# Patient Record
Sex: FEMALE | Race: WHITE | NOT HISPANIC OR LATINO | Employment: UNEMPLOYED | URBAN - NONMETROPOLITAN AREA
[De-identification: names, ages, dates, MRNs, and addresses within clinical notes are randomized per-mention and may not be internally consistent; named-entity substitution may affect disease eponyms.]

---

## 2017-01-19 ENCOUNTER — LAB REQUISITION (OUTPATIENT)
Dept: LAB | Facility: HOSPITAL | Age: 72
End: 2017-01-19

## 2017-01-19 DIAGNOSIS — D64.9 ANEMIA: ICD-10-CM

## 2017-01-19 DIAGNOSIS — J44.9 CHRONIC OBSTRUCTIVE PULMONARY DISEASE (HCC): ICD-10-CM

## 2017-01-19 DIAGNOSIS — I10 ESSENTIAL (PRIMARY) HYPERTENSION: ICD-10-CM

## 2017-01-19 LAB
ALBUMIN SERPL-MCNC: 3.8 G/DL (ref 3.4–4.8)
ALBUMIN/GLOB SERPL: 1.8 G/DL (ref 1.5–2.5)
ALP SERPL-CCNC: 54 U/L (ref 46–116)
ALT SERPL W P-5'-P-CCNC: 8 U/L (ref 10–36)
ANION GAP SERPL CALCULATED.3IONS-SCNC: 3.3 MMOL/L (ref 3.6–11.2)
AST SERPL-CCNC: 17 U/L (ref 10–30)
BASOPHILS # BLD AUTO: 0.05 10*3/MM3 (ref 0–0.3)
BASOPHILS NFR BLD AUTO: 0.4 % (ref 0–2)
BILIRUB SERPL-MCNC: 0.2 MG/DL (ref 0.2–1.8)
BUN BLD-MCNC: 14 MG/DL (ref 7–21)
BUN/CREAT SERPL: 26.9 (ref 7–25)
CALCIUM SPEC-SCNC: 8.8 MG/DL (ref 7.7–10)
CHLORIDE SERPL-SCNC: 104 MMOL/L (ref 99–112)
CO2 SERPL-SCNC: 38.7 MMOL/L (ref 24.3–31.9)
CREAT BLD-MCNC: 0.52 MG/DL (ref 0.43–1.29)
DEPRECATED RDW RBC AUTO: 52.6 FL (ref 37–54)
EOSINOPHIL # BLD AUTO: 0.42 10*3/MM3 (ref 0–0.7)
EOSINOPHIL NFR BLD AUTO: 3.3 % (ref 0–7)
ERYTHROCYTE [DISTWIDTH] IN BLOOD BY AUTOMATED COUNT: 14.7 % (ref 11.5–14.5)
GFR SERPL CREATININE-BSD FRML MDRD: 116 ML/MIN/1.73
GLOBULIN UR ELPH-MCNC: 2.1 GM/DL
GLUCOSE BLD-MCNC: 90 MG/DL (ref 70–110)
HCT VFR BLD AUTO: 34.7 % (ref 37–47)
HGB BLD-MCNC: 11 G/DL (ref 12–16)
IMM GRANULOCYTES # BLD: 0.03 10*3/MM3 (ref 0–0.03)
IMM GRANULOCYTES NFR BLD: 0.2 % (ref 0–0.5)
LYMPHOCYTES # BLD AUTO: 2.43 10*3/MM3 (ref 1–3)
LYMPHOCYTES NFR BLD AUTO: 18.8 % (ref 16–46)
MCH RBC QN AUTO: 31.1 PG (ref 27–33)
MCHC RBC AUTO-ENTMCNC: 31.7 G/DL (ref 33–37)
MCV RBC AUTO: 98 FL (ref 80–94)
MONOCYTES # BLD AUTO: 1.21 10*3/MM3 (ref 0.1–0.9)
MONOCYTES NFR BLD AUTO: 9.4 % (ref 0–12)
NEUTROPHILS # BLD AUTO: 8.77 10*3/MM3 (ref 1.4–6.5)
NEUTROPHILS NFR BLD AUTO: 67.9 % (ref 40–75)
OSMOLALITY SERPL CALC.SUM OF ELEC: 290.6 MOSM/KG (ref 273–305)
PLATELET # BLD AUTO: 341 10*3/MM3 (ref 130–400)
PMV BLD AUTO: 10.1 FL (ref 6–10)
POTASSIUM BLD-SCNC: 4.7 MMOL/L (ref 3.5–5.3)
PROT SERPL-MCNC: 5.9 G/DL (ref 6–8)
RBC # BLD AUTO: 3.54 10*6/MM3 (ref 4.2–5.4)
SODIUM BLD-SCNC: 146 MMOL/L (ref 135–153)
WBC NRBC COR # BLD: 12.91 10*3/MM3 (ref 4.5–12.5)

## 2017-01-19 PROCEDURE — 85025 COMPLETE CBC W/AUTO DIFF WBC: CPT | Performed by: INTERNAL MEDICINE

## 2017-01-19 PROCEDURE — 80053 COMPREHEN METABOLIC PANEL: CPT | Performed by: INTERNAL MEDICINE

## 2017-02-17 ENCOUNTER — APPOINTMENT (OUTPATIENT)
Dept: GENERAL RADIOLOGY | Facility: HOSPITAL | Age: 72
End: 2017-02-17

## 2017-02-17 ENCOUNTER — HOSPITAL ENCOUNTER (INPATIENT)
Facility: HOSPITAL | Age: 72
LOS: 5 days | Discharge: NURSING, REHAB OR LONG-TERM CARE | End: 2017-02-22
Attending: EMERGENCY MEDICINE | Admitting: INTERNAL MEDICINE

## 2017-02-17 DIAGNOSIS — R09.02 HYPOXIA: ICD-10-CM

## 2017-02-17 DIAGNOSIS — J18.9 PNEUMONIA OF LEFT LOWER LOBE DUE TO INFECTIOUS ORGANISM: Primary | ICD-10-CM

## 2017-02-17 DIAGNOSIS — A41.9 SEPSIS, DUE TO UNSPECIFIED ORGANISM: ICD-10-CM

## 2017-02-17 LAB
A-A DO2: 121.5 MMHG (ref 0–300)
ALBUMIN SERPL-MCNC: 4.9 G/DL (ref 3.4–4.8)
ALBUMIN/GLOB SERPL: 1.7 G/DL (ref 1.5–2.5)
ALP SERPL-CCNC: 51 U/L (ref 35–104)
ALT SERPL W P-5'-P-CCNC: 16 U/L (ref 10–36)
ANION GAP SERPL CALCULATED.3IONS-SCNC: 6.3 MMOL/L (ref 3.6–11.2)
ARTERIAL PATENCY WRIST A: ABNORMAL
AST SERPL-CCNC: 28 U/L (ref 10–30)
ATMOSPHERIC PRESS: 725 MMHG
BASE EXCESS BLDA CALC-SCNC: 7 MMOL/L
BASOPHILS # BLD AUTO: 0.04 10*3/MM3 (ref 0–0.3)
BASOPHILS NFR BLD AUTO: 0.3 % (ref 0–2)
BDY SITE: ABNORMAL
BILIRUB SERPL-MCNC: 0.4 MG/DL (ref 0.2–1.8)
BODY TEMPERATURE: 98.6 C
BUN BLD-MCNC: 14 MG/DL (ref 7–21)
BUN/CREAT SERPL: 30.4 (ref 7–25)
CALCIUM SPEC-SCNC: 10.1 MG/DL (ref 7.7–10)
CHLORIDE SERPL-SCNC: 103 MMOL/L (ref 99–112)
CO2 SERPL-SCNC: 34.7 MMOL/L (ref 24.3–31.9)
COHGB MFR BLD: 1.3 % (ref 0–5)
CREAT BLD-MCNC: 0.46 MG/DL (ref 0.43–1.29)
D-LACTATE SERPL-SCNC: 1.3 MMOL/L (ref 0.5–2)
D-LACTATE SERPL-SCNC: 3.7 MMOL/L (ref 0.5–2)
DEPRECATED RDW RBC AUTO: 51.1 FL (ref 37–54)
EOSINOPHIL # BLD AUTO: 0.18 10*3/MM3 (ref 0–0.7)
EOSINOPHIL NFR BLD AUTO: 1.2 % (ref 0–7)
ERYTHROCYTE [DISTWIDTH] IN BLOOD BY AUTOMATED COUNT: 13.8 % (ref 11.5–14.5)
GAS FLOW AIRWAY: 3 LPM
GFR SERPL CREATININE-BSD FRML MDRD: 134 ML/MIN/1.73
GLOBULIN UR ELPH-MCNC: 2.9 GM/DL
GLUCOSE BLD-MCNC: 93 MG/DL (ref 70–110)
HCO3 BLDA-SCNC: 32 MMOL/L (ref 22–26)
HCT VFR BLD AUTO: 43.9 % (ref 37–47)
HCT VFR BLD CALC: 39 % (ref 37–47)
HGB BLD-MCNC: 13.4 G/DL (ref 12–16)
HGB BLDA-MCNC: 13.2 G/DL (ref 12–16)
HOLD SPECIMEN: NORMAL
HOROWITZ INDEX BLD+IHG-RTO: 32 %
IMM GRANULOCYTES # BLD: 0.08 10*3/MM3 (ref 0–0.03)
IMM GRANULOCYTES NFR BLD: 0.5 % (ref 0–0.5)
LYMPHOCYTES # BLD AUTO: 2.52 10*3/MM3 (ref 1–3)
LYMPHOCYTES NFR BLD AUTO: 16.7 % (ref 16–46)
MCH RBC QN AUTO: 30.7 PG (ref 27–33)
MCHC RBC AUTO-ENTMCNC: 30.5 G/DL (ref 33–37)
MCV RBC AUTO: 100.7 FL (ref 80–94)
METHGB BLD QL: 0.3 % (ref 0–3)
MODALITY: ABNORMAL
MONOCYTES # BLD AUTO: 1.36 10*3/MM3 (ref 0.1–0.9)
MONOCYTES NFR BLD AUTO: 9 % (ref 0–12)
NEUTROPHILS # BLD AUTO: 10.93 10*3/MM3 (ref 1.4–6.5)
NEUTROPHILS NFR BLD AUTO: 72.3 % (ref 40–75)
OSMOLALITY SERPL CALC.SUM OF ELEC: 287 MOSM/KG (ref 273–305)
OXYHGB MFR BLDV: 78.7 % (ref 85–100)
PCO2 BLDA: 46.5 MM HG (ref 35–45)
PH BLDA: 7.46 PH UNITS (ref 7.35–7.45)
PLATELET # BLD AUTO: 270 10*3/MM3 (ref 130–400)
PMV BLD AUTO: 10.6 FL (ref 6–10)
PO2 BLDA: 41.1 MM HG (ref 80–100)
POTASSIUM BLD-SCNC: 4.3 MMOL/L (ref 3.5–5.3)
PROT SERPL-MCNC: 7.8 G/DL (ref 6–8)
RBC # BLD AUTO: 4.36 10*6/MM3 (ref 4.2–5.4)
SAO2 % BLDCOA: 80 % (ref 90–100)
SODIUM BLD-SCNC: 144 MMOL/L (ref 135–153)
WBC NRBC COR # BLD: 15.11 10*3/MM3 (ref 4.5–12.5)
WHOLE BLOOD HOLD SPECIMEN: NORMAL
WHOLE BLOOD HOLD SPECIMEN: NORMAL

## 2017-02-17 PROCEDURE — 36600 WITHDRAWAL OF ARTERIAL BLOOD: CPT | Performed by: EMERGENCY MEDICINE

## 2017-02-17 PROCEDURE — 94799 UNLISTED PULMONARY SVC/PX: CPT

## 2017-02-17 PROCEDURE — 25010000002 METHYLPREDNISOLONE PER 40 MG: Performed by: INTERNAL MEDICINE

## 2017-02-17 PROCEDURE — 25010000002 METHYLPREDNISOLONE PER 125 MG: Performed by: EMERGENCY MEDICINE

## 2017-02-17 PROCEDURE — 25010000002 CEFTRIAXONE: Performed by: EMERGENCY MEDICINE

## 2017-02-17 PROCEDURE — 25010000002 ENOXAPARIN PER 10 MG: Performed by: INTERNAL MEDICINE

## 2017-02-17 PROCEDURE — 94640 AIRWAY INHALATION TREATMENT: CPT

## 2017-02-17 PROCEDURE — 82375 ASSAY CARBOXYHB QUANT: CPT | Performed by: EMERGENCY MEDICINE

## 2017-02-17 PROCEDURE — 71010 HC CHEST PA OR AP: CPT

## 2017-02-17 PROCEDURE — 83605 ASSAY OF LACTIC ACID: CPT | Performed by: EMERGENCY MEDICINE

## 2017-02-17 PROCEDURE — 93005 ELECTROCARDIOGRAM TRACING: CPT | Performed by: EMERGENCY MEDICINE

## 2017-02-17 PROCEDURE — 25010000002 AZITHROMYCIN: Performed by: EMERGENCY MEDICINE

## 2017-02-17 PROCEDURE — 25010000002 PROCHLORPERAZINE EDISYLATE PER 10 MG: Performed by: EMERGENCY MEDICINE

## 2017-02-17 PROCEDURE — 99284 EMERGENCY DEPT VISIT MOD MDM: CPT

## 2017-02-17 PROCEDURE — 93010 ELECTROCARDIOGRAM REPORT: CPT | Performed by: INTERNAL MEDICINE

## 2017-02-17 PROCEDURE — 83050 HGB METHEMOGLOBIN QUAN: CPT | Performed by: EMERGENCY MEDICINE

## 2017-02-17 PROCEDURE — 87040 BLOOD CULTURE FOR BACTERIA: CPT | Performed by: EMERGENCY MEDICINE

## 2017-02-17 PROCEDURE — 82805 BLOOD GASES W/O2 SATURATION: CPT | Performed by: EMERGENCY MEDICINE

## 2017-02-17 PROCEDURE — 80053 COMPREHEN METABOLIC PANEL: CPT | Performed by: EMERGENCY MEDICINE

## 2017-02-17 PROCEDURE — 85025 COMPLETE CBC W/AUTO DIFF WBC: CPT | Performed by: EMERGENCY MEDICINE

## 2017-02-17 PROCEDURE — 71010 XR CHEST 1 VW: CPT | Performed by: RADIOLOGY

## 2017-02-17 RX ORDER — LITHIUM CARBONATE 150 MG/1
150 CAPSULE ORAL 2 TIMES DAILY WITH MEALS
Status: DISCONTINUED | OUTPATIENT
Start: 2017-02-17 | End: 2017-02-22 | Stop reason: HOSPADM

## 2017-02-17 RX ORDER — ALBUTEROL SULFATE 2.5 MG/3ML
2.5 SOLUTION RESPIRATORY (INHALATION) EVERY 4 HOURS PRN
Status: CANCELLED | OUTPATIENT
Start: 2017-02-17

## 2017-02-17 RX ORDER — PANTOPRAZOLE SODIUM 40 MG/1
40 TABLET, DELAYED RELEASE ORAL
Status: DISCONTINUED | OUTPATIENT
Start: 2017-02-17 | End: 2017-02-18 | Stop reason: SDUPTHER

## 2017-02-17 RX ORDER — SENNA PLUS 8.6 MG/1
1 TABLET ORAL DAILY
Status: DISCONTINUED | OUTPATIENT
Start: 2017-02-18 | End: 2017-02-22 | Stop reason: HOSPADM

## 2017-02-17 RX ORDER — NITROGLYCERIN 0.4 MG/1
0.4 TABLET SUBLINGUAL
Status: DISCONTINUED | OUTPATIENT
Start: 2017-02-17 | End: 2017-02-22 | Stop reason: HOSPADM

## 2017-02-17 RX ORDER — NITROGLYCERIN 0.4 MG/1
0.4 TABLET SUBLINGUAL
Status: CANCELLED | OUTPATIENT
Start: 2017-02-17

## 2017-02-17 RX ORDER — BUDESONIDE AND FORMOTEROL FUMARATE DIHYDRATE 160; 4.5 UG/1; UG/1
2 AEROSOL RESPIRATORY (INHALATION) EVERY 12 HOURS
Status: CANCELLED | OUTPATIENT
Start: 2017-02-17

## 2017-02-17 RX ORDER — MIRTAZAPINE 15 MG/1
30 TABLET, FILM COATED ORAL NIGHTLY
Status: CANCELLED | OUTPATIENT
Start: 2017-02-17

## 2017-02-17 RX ORDER — IPRATROPIUM BROMIDE AND ALBUTEROL SULFATE 2.5; .5 MG/3ML; MG/3ML
3 SOLUTION RESPIRATORY (INHALATION)
Status: DISCONTINUED | OUTPATIENT
Start: 2017-02-17 | End: 2017-02-17

## 2017-02-17 RX ORDER — SENNA PLUS 8.6 MG/1
1 TABLET ORAL DAILY
Status: CANCELLED | OUTPATIENT
Start: 2017-02-18

## 2017-02-17 RX ORDER — IPRATROPIUM BROMIDE AND ALBUTEROL SULFATE 2.5; .5 MG/3ML; MG/3ML
3 SOLUTION RESPIRATORY (INHALATION)
Status: DISCONTINUED | OUTPATIENT
Start: 2017-02-17 | End: 2017-02-22 | Stop reason: HOSPADM

## 2017-02-17 RX ORDER — TRAMADOL HYDROCHLORIDE 50 MG/1
50 TABLET ORAL 2 TIMES DAILY PRN
COMMUNITY

## 2017-02-17 RX ORDER — ESCITALOPRAM OXALATE 10 MG/1
20 TABLET ORAL DAILY
Status: CANCELLED | OUTPATIENT
Start: 2017-02-18

## 2017-02-17 RX ORDER — CYANOCOBALAMIN 1000 UG/ML
1000 INJECTION, SOLUTION INTRAMUSCULAR; SUBCUTANEOUS ONCE
Status: CANCELLED | OUTPATIENT
Start: 2017-03-15

## 2017-02-17 RX ORDER — PREDNISONE 10 MG/1
10 TABLET ORAL EVERY OTHER DAY
Status: CANCELLED | OUTPATIENT
Start: 2017-02-19

## 2017-02-17 RX ORDER — ACETAMINOPHEN 325 MG/1
1000 TABLET ORAL EVERY 6 HOURS PRN
Status: CANCELLED | OUTPATIENT
Start: 2017-02-17

## 2017-02-17 RX ORDER — LORAZEPAM 0.5 MG/1
0.5 TABLET ORAL TAKE AS DIRECTED
Status: CANCELLED | OUTPATIENT
Start: 2017-02-17

## 2017-02-17 RX ORDER — METHYLPREDNISOLONE SODIUM SUCCINATE 40 MG/ML
40 INJECTION, POWDER, LYOPHILIZED, FOR SOLUTION INTRAMUSCULAR; INTRAVENOUS EVERY 8 HOURS
Status: DISCONTINUED | OUTPATIENT
Start: 2017-02-17 | End: 2017-02-18

## 2017-02-17 RX ORDER — ASPIRIN 81 MG/1
81 TABLET, CHEWABLE ORAL DAILY
Status: CANCELLED | OUTPATIENT
Start: 2017-02-18

## 2017-02-17 RX ORDER — METOPROLOL TARTRATE 50 MG/1
50 TABLET, FILM COATED ORAL
Status: DISCONTINUED | OUTPATIENT
Start: 2017-02-17 | End: 2017-02-22 | Stop reason: HOSPADM

## 2017-02-17 RX ORDER — METOPROLOL TARTRATE 50 MG/1
50 TABLET, FILM COATED ORAL 2 TIMES DAILY
Status: CANCELLED | OUTPATIENT
Start: 2017-02-17

## 2017-02-17 RX ORDER — ATORVASTATIN CALCIUM 10 MG/1
10 TABLET, FILM COATED ORAL DAILY
Status: DISCONTINUED | OUTPATIENT
Start: 2017-02-18 | End: 2017-02-22 | Stop reason: HOSPADM

## 2017-02-17 RX ORDER — ACETAMINOPHEN 325 MG/1
650 TABLET ORAL EVERY 6 HOURS PRN
Status: DISCONTINUED | OUTPATIENT
Start: 2017-02-17 | End: 2017-02-22 | Stop reason: HOSPADM

## 2017-02-17 RX ORDER — DONEPEZIL HYDROCHLORIDE 5 MG/1
10 TABLET, FILM COATED ORAL NIGHTLY
Status: DISCONTINUED | OUTPATIENT
Start: 2017-02-17 | End: 2017-02-22 | Stop reason: HOSPADM

## 2017-02-17 RX ORDER — DIGOXIN 125 MCG
125 TABLET ORAL
Status: CANCELLED | OUTPATIENT
Start: 2017-02-18

## 2017-02-17 RX ORDER — DIPHENHYDRAMINE, LIDOCAINE, NYSTATIN
5 KIT ORAL 4 TIMES DAILY PRN
Status: DISCONTINUED | OUTPATIENT
Start: 2017-02-17 | End: 2017-02-22 | Stop reason: HOSPADM

## 2017-02-17 RX ORDER — ARIPIPRAZOLE 10 MG/1
5 TABLET ORAL DAILY
Status: DISCONTINUED | OUTPATIENT
Start: 2017-02-18 | End: 2017-02-22 | Stop reason: HOSPADM

## 2017-02-17 RX ORDER — MIRTAZAPINE 15 MG/1
30 TABLET, FILM COATED ORAL NIGHTLY
Status: DISCONTINUED | OUTPATIENT
Start: 2017-02-17 | End: 2017-02-22 | Stop reason: HOSPADM

## 2017-02-17 RX ORDER — LORAZEPAM 1 MG/1
1 TABLET ORAL EVERY MORNING
Status: DISCONTINUED | OUTPATIENT
Start: 2017-02-18 | End: 2017-02-22 | Stop reason: HOSPADM

## 2017-02-17 RX ORDER — TRAMADOL HYDROCHLORIDE 50 MG/1
50 TABLET ORAL 2 TIMES DAILY PRN
Status: DISCONTINUED | OUTPATIENT
Start: 2017-02-17 | End: 2017-02-22 | Stop reason: HOSPADM

## 2017-02-17 RX ORDER — LORAZEPAM 0.5 MG/1
0.5 TABLET ORAL
Status: DISCONTINUED | OUTPATIENT
Start: 2017-02-17 | End: 2017-02-22 | Stop reason: HOSPADM

## 2017-02-17 RX ORDER — LITHIUM 8 MEQ/5ML
100 SOLUTION ORAL 3 TIMES DAILY
Status: CANCELLED | OUTPATIENT
Start: 2017-02-17

## 2017-02-17 RX ORDER — CYANOCOBALAMIN 1000 UG/ML
1000 INJECTION, SOLUTION INTRAMUSCULAR; SUBCUTANEOUS ONCE
COMMUNITY

## 2017-02-17 RX ORDER — ATORVASTATIN CALCIUM 10 MG/1
10 TABLET, FILM COATED ORAL DAILY
Status: CANCELLED | OUTPATIENT
Start: 2017-02-18

## 2017-02-17 RX ORDER — TRAMADOL HYDROCHLORIDE 50 MG/1
50 TABLET ORAL 2 TIMES DAILY PRN
Status: CANCELLED | OUTPATIENT
Start: 2017-02-17

## 2017-02-17 RX ORDER — MEGESTROL ACETATE 40 MG/ML
200 SUSPENSION ORAL DAILY
Status: CANCELLED | OUTPATIENT
Start: 2017-02-17

## 2017-02-17 RX ORDER — MEGESTROL ACETATE 40 MG/ML
200 SUSPENSION ORAL DAILY
COMMUNITY

## 2017-02-17 RX ORDER — DILTIAZEM HYDROCHLORIDE 60 MG/1
120 TABLET, FILM COATED ORAL DAILY
Status: CANCELLED | OUTPATIENT
Start: 2017-02-18

## 2017-02-17 RX ORDER — DONEPEZIL HYDROCHLORIDE 5 MG/1
10 TABLET, FILM COATED ORAL NIGHTLY
Status: CANCELLED | OUTPATIENT
Start: 2017-02-17

## 2017-02-17 RX ORDER — ONDANSETRON 4 MG/1
4 TABLET, FILM COATED ORAL EVERY 6 HOURS PRN
Status: CANCELLED | OUTPATIENT
Start: 2017-02-17

## 2017-02-17 RX ORDER — IPRATROPIUM BROMIDE AND ALBUTEROL SULFATE 2.5; .5 MG/3ML; MG/3ML
SOLUTION RESPIRATORY (INHALATION)
Status: COMPLETED
Start: 2017-02-17 | End: 2017-02-17

## 2017-02-17 RX ORDER — DILTIAZEM HYDROCHLORIDE 60 MG/1
120 TABLET, FILM COATED ORAL DAILY
Status: DISCONTINUED | OUTPATIENT
Start: 2017-02-18 | End: 2017-02-22 | Stop reason: HOSPADM

## 2017-02-17 RX ORDER — METHYLPREDNISOLONE SODIUM SUCCINATE 125 MG/2ML
125 INJECTION, POWDER, LYOPHILIZED, FOR SOLUTION INTRAMUSCULAR; INTRAVENOUS ONCE
Status: COMPLETED | OUTPATIENT
Start: 2017-02-17 | End: 2017-02-17

## 2017-02-17 RX ORDER — ASPIRIN 81 MG/1
81 TABLET, CHEWABLE ORAL DAILY
Status: DISCONTINUED | OUTPATIENT
Start: 2017-02-18 | End: 2017-02-22 | Stop reason: HOSPADM

## 2017-02-17 RX ORDER — MEGESTROL ACETATE 40 MG/ML
200 SUSPENSION ORAL DAILY
Status: DISCONTINUED | OUTPATIENT
Start: 2017-02-18 | End: 2017-02-22 | Stop reason: HOSPADM

## 2017-02-17 RX ORDER — ESCITALOPRAM OXALATE 10 MG/1
10 TABLET ORAL DAILY
Status: CANCELLED | OUTPATIENT
Start: 2017-02-17

## 2017-02-17 RX ORDER — BUDESONIDE AND FORMOTEROL FUMARATE DIHYDRATE 160; 4.5 UG/1; UG/1
2 AEROSOL RESPIRATORY (INHALATION) EVERY 12 HOURS
Status: DISCONTINUED | OUTPATIENT
Start: 2017-02-17 | End: 2017-02-20

## 2017-02-17 RX ORDER — LORAZEPAM 0.5 MG/1
0.5 TABLET ORAL TAKE AS DIRECTED
COMMUNITY
End: 2017-02-22 | Stop reason: HOSPADM

## 2017-02-17 RX ORDER — PANTOPRAZOLE SODIUM 40 MG/1
40 TABLET, DELAYED RELEASE ORAL DAILY
Status: DISCONTINUED | OUTPATIENT
Start: 2017-02-18 | End: 2017-02-22 | Stop reason: HOSPADM

## 2017-02-17 RX ORDER — FUROSEMIDE 20 MG/1
10 TABLET ORAL DAILY
Status: DISCONTINUED | OUTPATIENT
Start: 2017-02-18 | End: 2017-02-22 | Stop reason: HOSPADM

## 2017-02-17 RX ORDER — FUROSEMIDE 20 MG/1
10 TABLET ORAL DAILY
Status: CANCELLED | OUTPATIENT
Start: 2017-02-18

## 2017-02-17 RX ORDER — IMIPRAMINE HCL 25 MG
25 TABLET ORAL 2 TIMES DAILY PRN
Status: CANCELLED | OUTPATIENT
Start: 2017-02-17

## 2017-02-17 RX ORDER — SODIUM CHLORIDE 9 MG/ML
INJECTION, SOLUTION INTRAVENOUS
Status: COMPLETED
Start: 2017-02-17 | End: 2017-02-17

## 2017-02-17 RX ORDER — DIPHENHYDRAMINE, LIDOCAINE, NYSTATIN
5 KIT ORAL 4 TIMES DAILY
Status: CANCELLED | OUTPATIENT
Start: 2017-02-17

## 2017-02-17 RX ORDER — ARIPIPRAZOLE 10 MG/1
5 TABLET ORAL DAILY
Status: CANCELLED | OUTPATIENT
Start: 2017-02-18

## 2017-02-17 RX ORDER — DIGOXIN 125 MCG
125 TABLET ORAL
Status: DISCONTINUED | OUTPATIENT
Start: 2017-02-18 | End: 2017-02-22 | Stop reason: HOSPADM

## 2017-02-17 RX ORDER — LORAZEPAM 1 MG/1
1 TABLET ORAL EVERY MORNING
Status: CANCELLED | OUTPATIENT
Start: 2017-02-18

## 2017-02-17 RX ORDER — SODIUM CHLORIDE 0.9 % (FLUSH) 0.9 %
10 SYRINGE (ML) INJECTION AS NEEDED
Status: DISCONTINUED | OUTPATIENT
Start: 2017-02-17 | End: 2017-02-22 | Stop reason: HOSPADM

## 2017-02-17 RX ORDER — PANTOPRAZOLE SODIUM 40 MG/1
40 TABLET, DELAYED RELEASE ORAL DAILY
Status: CANCELLED | OUTPATIENT
Start: 2017-02-18

## 2017-02-17 RX ORDER — ONDANSETRON 4 MG/1
4 TABLET, FILM COATED ORAL EVERY 6 HOURS PRN
Status: DISCONTINUED | OUTPATIENT
Start: 2017-02-17 | End: 2017-02-22 | Stop reason: HOSPADM

## 2017-02-17 RX ADMIN — METOPROLOL TARTRATE 50 MG: 50 TABLET, FILM COATED ORAL at 20:10

## 2017-02-17 RX ADMIN — SODIUM CHLORIDE 1500 ML: 900 INJECTION, SOLUTION INTRAVENOUS at 11:48

## 2017-02-17 RX ADMIN — DONEPEZIL HYDROCHLORIDE 10 MG: 5 TABLET, FILM COATED ORAL at 20:11

## 2017-02-17 RX ADMIN — IPRATROPIUM BROMIDE AND ALBUTEROL SULFATE 3 ML: .5; 3 SOLUTION RESPIRATORY (INHALATION) at 18:41

## 2017-02-17 RX ADMIN — METHYLPREDNISOLONE SODIUM SUCCINATE 125 MG: 125 INJECTION, POWDER, FOR SOLUTION INTRAMUSCULAR; INTRAVENOUS at 10:09

## 2017-02-17 RX ADMIN — METHYLPREDNISOLONE SODIUM SUCCINATE 40 MG: 40 INJECTION, POWDER, FOR SOLUTION INTRAMUSCULAR; INTRAVENOUS at 18:02

## 2017-02-17 RX ADMIN — CEFTRIAXONE 1 G: 1 INJECTION, POWDER, FOR SOLUTION INTRAMUSCULAR; INTRAVENOUS at 11:57

## 2017-02-17 RX ADMIN — MIRTAZAPINE 30 MG: 15 TABLET, FILM COATED ORAL at 20:10

## 2017-02-17 RX ADMIN — ENOXAPARIN SODIUM 30 MG: 30 INJECTION SUBCUTANEOUS at 18:01

## 2017-02-17 RX ADMIN — IPRATROPIUM BROMIDE AND ALBUTEROL SULFATE 3 ML: .5; 3 SOLUTION RESPIRATORY (INHALATION) at 10:04

## 2017-02-17 RX ADMIN — IPRATROPIUM BROMIDE AND ALBUTEROL SULFATE 3 ML: 2.5; .5 SOLUTION RESPIRATORY (INHALATION) at 10:04

## 2017-02-17 RX ADMIN — PANTOPRAZOLE SODIUM 40 MG: 40 TABLET, DELAYED RELEASE ORAL at 18:01

## 2017-02-17 RX ADMIN — LITHIUM CARBONATE 150 MG: 150 CAPSULE, GELATIN COATED ORAL at 20:10

## 2017-02-17 RX ADMIN — PROCHLORPERAZINE EDISYLATE 5 MG: 5 INJECTION INTRAMUSCULAR; INTRAVENOUS at 11:44

## 2017-02-17 RX ADMIN — BUDESONIDE AND FORMOTEROL FUMARATE DIHYDRATE 2 PUFF: 160; 4.5 AEROSOL RESPIRATORY (INHALATION) at 18:41

## 2017-02-17 RX ADMIN — AZITHROMYCIN 500 MG: 500 INJECTION, POWDER, LYOPHILIZED, FOR SOLUTION INTRAVENOUS at 12:48

## 2017-02-17 RX ADMIN — LORAZEPAM 0.5 MG: 0.5 TABLET ORAL at 20:10

## 2017-02-17 NOTE — PROGRESS NOTES
Discharge Planning Assessment  [facility]     Patient Name: Shona Aguilar  MRN: [id number]  Today's Date: 2/17/2017    Admit Date: 2/17/2017          Discharge Needs Assessment       02/17/17 1235    Living Environment    Lives With facility resident    Living Arrangements extended care facility    Discharge Needs Assessment    Concerns To Be Addressed transportation;discharge planning concerns    Readmission Within The Last 30 Days no previous admission in last 30 days   LAST ADMIT 7/8/16 FOR SOA    Discharge Planning Comments SOCIAL SERVICE CONSULT PLACED R/T DISCHARGE PLANNING  AND TRANSPORTATION. PT IS A RESIDENT AT [facility], BEING ADMITTED TO [facility] TO DR VARGAS FOR PNEUMONIA ON VENIMASK.            Discharge Plan             Discharge Placement                     Demographic Summary       02/17/17 1235    Referral Information    Admission Type inpatient    Arrived From skilled nursing facility    Referral Source admission list    Reason For Consult discharge planning;transportation    Record Reviewed history and physical            Functional Status                 Psychosocial                 Abuse/Neglect                 Legal                 Substance Abuse                 Patient Forms               Kaya Reddy RN

## 2017-02-17 NOTE — H&P
Chief complaint shortness of breath    Subjective     Patient is a 71 y.o. female resident of the [facility] who was noted to have oxygen desaturation and respiratory distress as well as confusion.  She was sent to the emergency room for evaluation.  In the emergency room the patient was seen and evaluated and diagnosed with pneumonia.  Is been started on intravenous antibiotics.  His also been treated with nebulizer treatments and supplemental oxygen.   is currently arousable and talking.  She continues with complaints of shortness of breath, cough, congestion and generalized weakness.  She denies any hemoptysis.  She describes having an improvement since the time of admission area unfortunately, she has chronic shortness of breath and at baseline has chronic respiratory distress with any exertion or position change.  I discussed with the nurses at the nursing home and they reported the patient was having progressive symptoms 3-4 hours prior to admission or transfer to [facility].    Review of Systems   Review of Systems   Constitutional: Positive for activity change and fatigue. Negative for appetite change, chills, fever and unexpected weight change.   HENT: Positive for congestion. Negative for drooling, hearing loss, mouth sores, nosebleeds, postnasal drip, rhinorrhea, sinus pressure, sneezing and sore throat.    Eyes: Negative for pain, discharge, redness, itching and visual disturbance.   Respiratory: Positive for cough, shortness of breath and wheezing. Negative for apnea and choking.    Cardiovascular: Positive for leg swelling. Negative for chest pain and palpitations.   Gastrointestinal: Negative for abdominal distention, abdominal pain and anal bleeding.   Endocrine: Negative for cold intolerance, heat intolerance, polydipsia, polyphagia and polyuria.   Genitourinary: Negative for difficulty urinating, dysuria, enuresis, frequency, hematuria, urgency and  vaginal pain.   Skin: Negative for color change, pallor, rash and wound.   Allergic/Immunologic: Negative for environmental allergies, food allergies and immunocompromised state.   Neurological: Positive for weakness. Negative for dizziness, tremors, seizures, facial asymmetry, light-headedness and headaches.   Psychiatric/Behavioral: Positive for confusion and dysphoric mood. Negative for agitation, behavioral problems, hallucinations, self-injury, sleep disturbance and suicidal ideas. The patient is not nervous/anxious and is not hyperactive.         Past Medical History  Past Medical History   Diagnosis Date   • Anxiety    • COPD (chronic obstructive pulmonary disease)    • Depression    • GERD (gastroesophageal reflux disease)    • Hyperlipidemia    • Hypertension      Surgical History  Past Surgical History   Procedure Laterality Date   • Hip fracture surgery Right    • Abdominal surgery     • Cholecystectomy     • Appendectomy       Family History  History reviewed. No pertinent family history.  Social History  Social History   Substance Use Topics   • Smoking status: Former Smoker     Quit date: 1/8/2015   • Smokeless tobacco: None   • Alcohol use No     Home Medications  Prescriptions Prior to Admission   Medication Sig Dispense Refill Last Dose   • acetaminophen (TYLENOL) 500 MG tablet Take 1,000 mg by mouth every 6 (six) hours as needed for mild pain (1-3).   2/16/2017 at 2000   • albuterol (PROVENTIL) (2.5 MG/3ML) 0.083% nebulizer solution Take 2.5 mg by nebulization every 4 (four) hours as needed for wheezing. 30 vial 0 2/17/2017 at 0800   • ARIPiprazole (ABILIFY) 5 MG tablet Take 5 mg by mouth daily.   2/17/2017 at 0800   • aspirin 81 MG chewable tablet Chew 81 mg daily.   2/17/2017 at 0800   • atorvastatin (LIPITOR) 10 MG tablet Take 10 mg by mouth daily.   2/17/2017 at 0800   • budesonide-formoterol (SYMBICORT) 160-4.5 MCG/ACT inhaler Inhale 2 puffs every 12 (twelve) hours.   2/17/2017 at 0800   •  cyanocobalamin 1000 MCG/ML injection Inject 1,000 mcg into the shoulder, thigh, or buttocks 1 (One) Time. On 15th   2/15/2017 at 0800   • digoxin (LANOXIN) 125 MCG tablet Take 125 mcg by mouth every day.   2/17/2017 at 0800   • diltiazem (CARDIZEM) 120 MG tablet Take 120 mg by mouth daily.   2/17/2017 at 0800   • donepezil (ARICEPT) 5 MG tablet Take 10 mg by mouth Every Night.   2/16/2017 at 2000   • escitalopram (LEXAPRO) 20 MG tablet Take 20 mg by mouth daily.   2/17/2017 at 0800   • furosemide (LASIX) 20 MG tablet Take 10 mg by mouth daily.   2/17/2017 at 0800   • lithium 8 MEQ/5ML solution oral solution Take 100 mg by mouth 3 (three) times a day.   2/17/2017 at 1200   • LORazepam (ATIVAN) 0.5 MG tablet Take 1 mg by mouth Every Morning.   2/17/2017 at 0800   • LORazepam (ATIVAN) 0.5 MG tablet Take 0.5 mg by mouth Take As Directed. Takes at 1200 and 2000 2/17/2017 at 1200   • megestrol (MEGACE) 40 MG/ML suspension Take 200 mg by mouth Daily.   2/17/2017 at 0800   • metoprolol tartrate (LOPRESSOR) 50 MG tablet Take 50 mg by mouth 2 (Two) Times a Day. Hold id systolic <110   2/17/2017 at 0800   • mirtazapine (REMERON) 30 MG tablet Take 30 mg by mouth every night.   2/16/2017 at 2000   • ondansetron (ZOFRAN) 4 MG tablet Take 4 mg by mouth every 6 (six) hours as needed for nausea or vomiting.   2/16/2017 at 0800   • pantoprazole (PROTONIX) 40 MG EC tablet Take 40 mg by mouth daily.   2/17/2017 at 0800   • predniSONE (DELTASONE) 10 MG tablet Take 10 mg by mouth every other day.   2/17/2017 at 0800   • senna (Senna) 8.6 MG tablet Take 1 tablet by mouth daily.   2/17/2017 at 0800   • tiotropium (SPIRIVA) 18 MCG per inhalation capsule Place 1 capsule into inhaler and inhale 1 (one) time daily.   2/17/2017 at 0800   • traMADol (ULTRAM) 50 MG tablet Take 50 mg by mouth 2 (Two) Times a Day As Needed for moderate pain (4-6).   2/17/2017 at 0800   • albuterol (PROVENTIL HFA;VENTOLIN HFA) 108 (90 BASE) MCG/ACT inhaler Inhale  2 puffs every 4 (four) hours as needed for wheezing.   Unknown at Unknown time   • nitroglycerin (NITROSTAT) 0.4 MG SL tablet Place 0.4 mg under the tongue every 5 (five) minutes as needed for chest pain. Take no more than 3 doses in 15 minutes.   Unknown at Unknown time   • Nystatin (MAGIC MOUTHWASH) Swish and spit 5 mL 4 (four) times a day as needed.   Unknown at Unknown time         Allergies:  Review of patient's allergies indicates no known allergies.    Objective     Vital Signs  Temp:  [98.3 °F (36.8 °C)-98.7 °F (37.1 °C)] 98.3 °F (36.8 °C)  Heart Rate:  [] 74  Resp:  [24-28] 24  BP: ()/(47-73) 120/58    Physical Exam:    Physical Exam   Constitutional: She is oriented to person, place, and time.   HENT:   Head: Normocephalic and atraumatic.   Right Ear: External ear normal.   Left Ear: External ear normal.   Eyes: Conjunctivae and EOM are normal. Pupils are equal, round, and reactive to light. Right eye exhibits no discharge. Left eye exhibits no discharge. No scleral icterus.   Neck: Normal range of motion. Neck supple. No JVD present. No tracheal deviation present. No thyromegaly present.   Cardiovascular: Normal rate, regular rhythm, normal heart sounds and intact distal pulses.  Exam reveals no gallop and no friction rub.    No murmur heard.  Pulmonary/Chest: No stridor. She has no rales. She exhibits no tenderness.   Respiratory distress with any conversation.  Rhonchi and wheezing bilaterally.  Diminished breath sounds bilaterally.  Increased fremitus bilaterally.   Abdominal: Soft. Bowel sounds are normal. She exhibits no distension and no mass. There is no tenderness. There is no guarding. No hernia.   Genitourinary:   Genitourinary Comments: No Liu catheter   Musculoskeletal: She exhibits no edema, tenderness or deformity.   Lymphadenopathy:     She has no cervical adenopathy.   Neurological: She is alert and oriented to person, place, and time. She has normal reflexes. She displays  normal reflexes. No cranial nerve deficit. She exhibits normal muscle tone. Coordination normal.   Skin: No rash noted. No erythema. No pallor.   Psychiatric: She has a normal mood and affect.   Nursing note and vitals reviewed.      Results Review:    I reviewed the patient's clinical results from admission:  Imaging Results (last 72 hours)     Procedure Component Value Units Date/Time    XR Chest 1 View [[id number]] Collected:  02/17/17 1047     Updated:  02/17/17 1049    Narrative:       EXAMINATION:  XR CHEST 1 VW-      CLINICAL INDICATION:     Simple sepsis protocol     TECHNIQUE:  XR CHEST 1 VW-       COMPARISON: 9/13/2016      FINDINGS:   Left lower lobe pneumonia.   Heart size is stable.   No pneumothorax.   No pleural effusion.   Bony and soft tissue structures are unremarkable.            Impression:       Stable radiographic appearance of the chest.     This report was finalized on 2/17/2017 10:47 AM by Dr. Zane Carlson MD.           Lab Results (last 72 hours)     Procedure Component Value Units Date/Time    Blood Gas, Arterial With Co-Ox [[id number]]  (Abnormal) Collected:  02/17/17 0946    Specimen:  Arterial Blood Updated:  02/17/17 0955     Site Arterial: left brachial      Dandre's Test N/A      pH, Arterial 7.455 (H) pH units      pCO2, Arterial 46.5 (H) mm Hg      pO2, Arterial 41.1 (C) mm Hg      HCO3, Arterial 32.0 (C) mmol/L      Base Excess, Arterial 7.0 mmol/L      O2 Saturation, Arterial 80.0 (C) %      Hemoglobin, Blood Gas 13.2 g/dL      Hematocrit, Blood Gas 39.0 %      Oxyhemoglobin 78.7 (L) %      Methemoglobin 0.3 %      Carboxyhemoglobin 1.3 %      A-a Gradiant 121.5 mmHg      Temperature 98.6 C      Barometric Pressure for Blood Gas 725 mmHg      Modality Cannula - Nasal      FIO2 32 %      Flow Rate 3 lpm     CBC & Differential [[id number]] Collected:  02/17/17 1015    Specimen:  Blood Updated:  02/17/17 1031    Narrative:       The following orders were created for panel order CBC &  Differential.  Procedure                               Abnormality         Status                     ---------                               -----------         ------                     CBC Auto Differential[[id number]]         Abnormal            Final result                 Please view results for these tests on the individual orders.    CBC Auto Differential [[id number]]  (Abnormal) Collected:  02/17/17 1015    Specimen:  Blood from Arm, Left Updated:  02/17/17 1031     WBC 15.11 (H) 10*3/mm3      RBC 4.36 10*6/mm3      Hemoglobin 13.4 g/dL      Hematocrit 43.9 %      .7 (H) fL      MCH 30.7 pg      MCHC 30.5 (L) g/dL      RDW 13.8 %      RDW-SD 51.1 fl      MPV 10.6 (H) fL      Platelets 270 10*3/mm3      Neutrophil % 72.3 %      Lymphocyte % 16.7 %      Monocyte % 9.0 %      Eosinophil % 1.2 %      Basophil % 0.3 %      Immature Grans % 0.5 %      Neutrophils, Absolute 10.93 (H) 10*3/mm3      Lymphocytes, Absolute 2.52 10*3/mm3      Monocytes, Absolute 1.36 (H) 10*3/mm3      Eosinophils, Absolute 0.18 10*3/mm3      Basophils, Absolute 0.04 10*3/mm3      Immature Grans, Absolute 0.08 (H) 10*3/mm3     [facility] [[id number]] Collected:  02/17/17 1015    Specimen:  Blood from Arm, Left Updated:  02/17/17 1037     Extra Tube --     Lactic Acid, Plasma [[id number]]  (Abnormal) Collected:  02/17/17 1015    Specimen:  Blood from Arm, Left Updated:  02/17/17 1047     Lactate 3.7 (C) mmol/L     Blood Culture [[id number]] Collected:  02/17/17 1035    Specimen:  Blood from Arm, Left Updated:  02/17/17 1048    Comprehensive Metabolic Panel [[id number]]  (Abnormal) Collected:  02/17/17 1015    Specimen:  Blood from Arm, Left Updated:  02/17/17 1051     Glucose 93 mg/dL      BUN 14 mg/dL      Creatinine 0.46 mg/dL      Sodium 144 mmol/L      Potassium 4.3 mmol/L       1+ Hemolysis         Chloride 103 mmol/L      CO2 34.7 (H) mmol/L      Calcium 10.1 (H) mg/dL      Total Protein 7.8 g/dL      Albumin 4.90 (H) g/dL      ALT  (SGPT) 16 U/L      AST (SGOT) 28 U/L      Alkaline Phosphatase 51 U/L       Note New Reference Ranges        Total Bilirubin 0.4 mg/dL      eGFR Non African Amer 134 mL/min/1.73      Globulin 2.9 gm/dL      A/G Ratio 1.7 g/dL      BUN/Creatinine Ratio 30.4 (H)      Anion Gap 6.3 mmol/L     Narrative:       The MDRD GFR formula is only valid for adults with stable renal function between ages 18 and 70.    Osmolality, Calculated [[id number]]  (Normal) Collected:  02/17/17 1015    Specimen:  Blood from Arm, Left Updated:  02/17/17 1051     Osmolality Calc 287.0 mOsm/kg     Blood Culture [[id number]] Collected:  02/17/17 1113    Specimen:  Blood from Arm, Right Updated:  02/17/17 1141    Lactate Acid, Reflex [[id number]]  (Normal) Collected:  02/17/17 1351    Specimen:  Blood Updated:  02/17/17 1446     Lactate 1.3 mmol/L     [place] Draw [[id number]] Collected:  02/17/17 1015    Specimen:  Blood Updated:  02/17/17 1501    Narrative:       The following orders were created for panel order [place] Draw.  Procedure                               Abnormality         Status                     ---------                               -----------         ------                     Light Blue Top[[id number]]                                    Final result               Green Top (Gel)[[id number]]                                                              Lavender Top[[id number]]                                      Final result               Gold Top - SST[[id number]]                                    Final result                 Please view results for these tests on the individual orders.    Light Blue Top [[id number]] Collected:  02/17/17 1015    Specimen:  Blood from Arm, Left Updated:  02/17/17 1501     Extra Tube hold for add-on       Auto resulted       Lavender Top [[id number]] Collected:  02/17/17 1015    Specimen:  Blood from Arm, Left Updated:  02/17/17 1501     Extra Tube hold for add-on       Auto resulted                  Assessment/Plan     Active Problems:   1) Pneumonia-left lower lobe-blood cultures obtained and are pending.  2) COPD exacerbation  3) acute on chronic respiratory failure with decreased saturations and respiratory distress from baseline.  4) early sepsis  5) lactic acidosis  6) history of paroxysmal atrial fibrillation  7) history of bipolar disorder  8) l chronic limited mobility secondary to respiratory distress  9) leukocytosis  10) DVT prophylaxis-subcutaneous Lovenox  11) stress gastritis prophylaxis-on Protonix orally.          I discussed the patients findings and my recommendations with patient and nursing staff.     Mohamud Acosta MD  02/17/17  5:22 PM    Time: 33 minutes of time with history and physical and coordination of care

## 2017-02-17 NOTE — ED NOTES
This nurse noticed blood around bp cuff removed bp cuff skin tear present notified lead nurse and md per md orders placed non adherent dressing with clean at site of skin tear     Iris Mars RN  02/17/17 6613

## 2017-02-17 NOTE — ED NOTES
Pt is aao x4 no signs of distress at this time ed tech transporting patient to [address] [place] [address]     Irisalejandro Mars, MAR  02/17/17 6045

## 2017-02-17 NOTE — ED NOTES
Pt reports that she has had increased shortness of breath.  Nursing Home called to report that the patient has decreased O2 sats.       Brenna Daly RN  02/17/17 4130

## 2017-02-17 NOTE — PROGRESS NOTES
Discharge Planning Assessment  [facility]     Patient Name: Shona Aguilar  MRN: [id number]  Today's Date: 2/17/2017    Admit Date: 2/17/2017          Discharge Needs Assessment       02/17/17 1621    Discharge Needs Assessment    Discharge Disposition skilled nursing facility   SS contacted [facility] per Demi who states pt has a 14 day skilled bed hold.             Discharge Plan       02/17/17 1622    Case Management/Social Work Plan    Plan Pt was admitted from [facility] and has a 14 day skilled bed hold. SS to follow and assist as needed with discharge planning.         Discharge Placement     Facility/Agency Request Status Selected? Address Phone Number Fax Number    [facility] Pending - No Request Sent     [address] [facility] [address] [phone]                Demographic Summary       02/17/17 1621    Referral Information    Referral Source nursing    Reason For Consult --   SS received consult discharge planning. Pt was admitted from [facility].         Ciara Forbes

## 2017-02-17 NOTE — SIGNIFICANT NOTE
Skin barrier applied to cheeks due to v-mask straps and around mouth and over nose due to mask.  Skin in good condition.

## 2017-02-18 LAB
ALBUMIN SERPL-MCNC: 4.1 G/DL (ref 3.4–4.8)
ALBUMIN/GLOB SERPL: 1.4 G/DL (ref 1.5–2.5)
ALP SERPL-CCNC: 42 U/L (ref 35–104)
ALT SERPL W P-5'-P-CCNC: 9 U/L (ref 10–36)
ANION GAP SERPL CALCULATED.3IONS-SCNC: 7 MMOL/L (ref 3.6–11.2)
AST SERPL-CCNC: 19 U/L (ref 10–30)
BASOPHILS # BLD AUTO: 0.01 10*3/MM3 (ref 0–0.3)
BASOPHILS NFR BLD AUTO: 0.1 % (ref 0–2)
BILIRUB SERPL-MCNC: 0.3 MG/DL (ref 0.2–1.8)
BNP SERPL-MCNC: 389 PG/ML (ref 0–100)
BUN BLD-MCNC: 12 MG/DL (ref 7–21)
BUN/CREAT SERPL: 24 (ref 7–25)
CALCIUM SPEC-SCNC: 9.6 MG/DL (ref 7.7–10)
CHLORIDE SERPL-SCNC: 105 MMOL/L (ref 99–112)
CO2 SERPL-SCNC: 31 MMOL/L (ref 24.3–31.9)
CREAT BLD-MCNC: 0.5 MG/DL (ref 0.43–1.29)
CRP SERPL-MCNC: 7.55 MG/DL (ref 0–0.99)
DEPRECATED RDW RBC AUTO: 49.5 FL (ref 37–54)
EOSINOPHIL # BLD AUTO: 0 10*3/MM3 (ref 0–0.7)
EOSINOPHIL NFR BLD AUTO: 0 % (ref 0–7)
ERYTHROCYTE [DISTWIDTH] IN BLOOD BY AUTOMATED COUNT: 13.4 % (ref 11.5–14.5)
GFR SERPL CREATININE-BSD FRML MDRD: 122 ML/MIN/1.73
GLOBULIN UR ELPH-MCNC: 2.9 GM/DL
GLUCOSE BLD-MCNC: 167 MG/DL (ref 70–110)
HCT VFR BLD AUTO: 39.5 % (ref 37–47)
HGB BLD-MCNC: 11.6 G/DL (ref 12–16)
IMM GRANULOCYTES # BLD: 0.02 10*3/MM3 (ref 0–0.03)
IMM GRANULOCYTES NFR BLD: 0.2 % (ref 0–0.5)
LYMPHOCYTES # BLD AUTO: 0.81 10*3/MM3 (ref 1–3)
LYMPHOCYTES NFR BLD AUTO: 10.1 % (ref 16–46)
MAGNESIUM SERPL-MCNC: 2.3 MG/DL (ref 1.7–2.6)
MCH RBC QN AUTO: 29.8 PG (ref 27–33)
MCHC RBC AUTO-ENTMCNC: 29.4 G/DL (ref 33–37)
MCV RBC AUTO: 101.5 FL (ref 80–94)
MONOCYTES # BLD AUTO: 0.23 10*3/MM3 (ref 0.1–0.9)
MONOCYTES NFR BLD AUTO: 2.9 % (ref 0–12)
NEUTROPHILS # BLD AUTO: 6.98 10*3/MM3 (ref 1.4–6.5)
NEUTROPHILS NFR BLD AUTO: 86.7 % (ref 40–75)
OSMOLALITY SERPL CALC.SUM OF ELEC: 288.5 MOSM/KG (ref 273–305)
PLATELET # BLD AUTO: 228 10*3/MM3 (ref 130–400)
PMV BLD AUTO: 10.2 FL (ref 6–10)
POTASSIUM BLD-SCNC: 4.2 MMOL/L (ref 3.5–5.3)
PROT SERPL-MCNC: 7 G/DL (ref 6–8)
RBC # BLD AUTO: 3.89 10*6/MM3 (ref 4.2–5.4)
SODIUM BLD-SCNC: 143 MMOL/L (ref 135–153)
WBC NRBC COR # BLD: 8.05 10*3/MM3 (ref 4.5–12.5)

## 2017-02-18 PROCEDURE — 83880 ASSAY OF NATRIURETIC PEPTIDE: CPT | Performed by: INTERNAL MEDICINE

## 2017-02-18 PROCEDURE — 94640 AIRWAY INHALATION TREATMENT: CPT

## 2017-02-18 PROCEDURE — 25010000002 AZITHROMYCIN: Performed by: INTERNAL MEDICINE

## 2017-02-18 PROCEDURE — 25010000002 CEFTRIAXONE: Performed by: INTERNAL MEDICINE

## 2017-02-18 PROCEDURE — 25010000002 METHYLPREDNISOLONE PER 40 MG: Performed by: INTERNAL MEDICINE

## 2017-02-18 PROCEDURE — 86140 C-REACTIVE PROTEIN: CPT | Performed by: INTERNAL MEDICINE

## 2017-02-18 PROCEDURE — 85025 COMPLETE CBC W/AUTO DIFF WBC: CPT | Performed by: INTERNAL MEDICINE

## 2017-02-18 PROCEDURE — 25010000002 ENOXAPARIN PER 10 MG: Performed by: INTERNAL MEDICINE

## 2017-02-18 PROCEDURE — 94799 UNLISTED PULMONARY SVC/PX: CPT

## 2017-02-18 PROCEDURE — 80053 COMPREHEN METABOLIC PANEL: CPT | Performed by: INTERNAL MEDICINE

## 2017-02-18 PROCEDURE — 83735 ASSAY OF MAGNESIUM: CPT | Performed by: INTERNAL MEDICINE

## 2017-02-18 RX ORDER — HYDROCODONE BITARTRATE AND ACETAMINOPHEN 5; 325 MG/1; MG/1
1 TABLET ORAL EVERY 6 HOURS PRN
Status: DISCONTINUED | OUTPATIENT
Start: 2017-02-18 | End: 2017-02-22 | Stop reason: HOSPADM

## 2017-02-18 RX ORDER — METHYLPREDNISOLONE SODIUM SUCCINATE 40 MG/ML
10 INJECTION, POWDER, LYOPHILIZED, FOR SOLUTION INTRAMUSCULAR; INTRAVENOUS EVERY 8 HOURS
Status: DISCONTINUED | OUTPATIENT
Start: 2017-02-18 | End: 2017-02-19

## 2017-02-18 RX ADMIN — METOPROLOL TARTRATE 50 MG: 50 TABLET, FILM COATED ORAL at 09:03

## 2017-02-18 RX ADMIN — BUDESONIDE AND FORMOTEROL FUMARATE DIHYDRATE 2 PUFF: 160; 4.5 AEROSOL RESPIRATORY (INHALATION) at 17:22

## 2017-02-18 RX ADMIN — PANTOPRAZOLE SODIUM 40 MG: 40 TABLET, DELAYED RELEASE ORAL at 09:02

## 2017-02-18 RX ADMIN — DIGOXIN 125 MCG: 125 TABLET ORAL at 12:19

## 2017-02-18 RX ADMIN — ASPIRIN 81 MG: 81 TABLET, CHEWABLE ORAL at 09:05

## 2017-02-18 RX ADMIN — ACETAMINOPHEN 650 MG: 325 TABLET ORAL at 09:05

## 2017-02-18 RX ADMIN — ENOXAPARIN SODIUM 30 MG: 30 INJECTION SUBCUTANEOUS at 09:06

## 2017-02-18 RX ADMIN — METHYLPREDNISOLONE SODIUM SUCCINATE 40 MG: 40 INJECTION, POWDER, FOR SOLUTION INTRAMUSCULAR; INTRAVENOUS at 09:14

## 2017-02-18 RX ADMIN — HYDROCODONE BITARTRATE AND ACETAMINOPHEN 1 TABLET: 5; 325 TABLET ORAL at 16:47

## 2017-02-18 RX ADMIN — AZITHROMYCIN 500 MG: 500 INJECTION, POWDER, LYOPHILIZED, FOR SOLUTION INTRAVENOUS at 13:31

## 2017-02-18 RX ADMIN — BUDESONIDE AND FORMOTEROL FUMARATE DIHYDRATE 2 PUFF: 160; 4.5 AEROSOL RESPIRATORY (INHALATION) at 06:46

## 2017-02-18 RX ADMIN — DONEPEZIL HYDROCHLORIDE 10 MG: 5 TABLET, FILM COATED ORAL at 20:58

## 2017-02-18 RX ADMIN — CEFTRIAXONE 1 G: 1 INJECTION, POWDER, FOR SOLUTION INTRAMUSCULAR; INTRAVENOUS at 12:19

## 2017-02-18 RX ADMIN — MIRTAZAPINE 30 MG: 15 TABLET, FILM COATED ORAL at 20:58

## 2017-02-18 RX ADMIN — HYDROCODONE BITARTRATE AND ACETAMINOPHEN 1 TABLET: 5; 325 TABLET ORAL at 10:34

## 2017-02-18 RX ADMIN — METHYLPREDNISOLONE SODIUM SUCCINATE 10 MG: 40 INJECTION, POWDER, FOR SOLUTION INTRAMUSCULAR; INTRAVENOUS at 17:50

## 2017-02-18 RX ADMIN — LORAZEPAM 0.5 MG: 0.5 TABLET ORAL at 12:19

## 2017-02-18 RX ADMIN — DILTIAZEM HYDROCHLORIDE 120 MG: 60 TABLET, FILM COATED ORAL at 09:02

## 2017-02-18 RX ADMIN — MEGESTROL ACETATE 200 MG: 40 SUSPENSION ORAL at 09:09

## 2017-02-18 RX ADMIN — METHYLPREDNISOLONE SODIUM SUCCINATE 40 MG: 40 INJECTION, POWDER, FOR SOLUTION INTRAMUSCULAR; INTRAVENOUS at 04:24

## 2017-02-18 RX ADMIN — LORAZEPAM 1 MG: 1 TABLET ORAL at 07:44

## 2017-02-18 RX ADMIN — IPRATROPIUM BROMIDE AND ALBUTEROL SULFATE 3 ML: .5; 3 SOLUTION RESPIRATORY (INHALATION) at 12:36

## 2017-02-18 RX ADMIN — IPRATROPIUM BROMIDE AND ALBUTEROL SULFATE 3 ML: .5; 3 SOLUTION RESPIRATORY (INHALATION) at 06:46

## 2017-02-18 RX ADMIN — IPRATROPIUM BROMIDE AND ALBUTEROL SULFATE 3 ML: .5; 3 SOLUTION RESPIRATORY (INHALATION) at 00:00

## 2017-02-18 RX ADMIN — IPRATROPIUM BROMIDE AND ALBUTEROL SULFATE 3 ML: .5; 3 SOLUTION RESPIRATORY (INHALATION) at 17:22

## 2017-02-18 RX ADMIN — LITHIUM CARBONATE 150 MG: 150 CAPSULE, GELATIN COATED ORAL at 17:49

## 2017-02-18 RX ADMIN — ARIPIPRAZOLE 5 MG: 10 TABLET ORAL at 09:06

## 2017-02-18 RX ADMIN — METOPROLOL TARTRATE 50 MG: 50 TABLET, FILM COATED ORAL at 20:58

## 2017-02-18 RX ADMIN — LORAZEPAM 0.5 MG: 0.5 TABLET ORAL at 20:58

## 2017-02-18 RX ADMIN — LITHIUM CARBONATE 150 MG: 150 CAPSULE, GELATIN COATED ORAL at 07:44

## 2017-02-18 RX ADMIN — ATORVASTATIN CALCIUM 10 MG: 10 TABLET, FILM COATED ORAL at 09:05

## 2017-02-18 RX ADMIN — FUROSEMIDE 10 MG: 20 TABLET ORAL at 09:03

## 2017-02-18 NOTE — PROGRESS NOTES
LOS: 1 day       Chief Complaint :   Shortness of breath     Subjective     Interval History:     Patient Complaints:  Shona Aguilar  has recently been admitted for COPD exacerbation and pneumonia.  She continues on nebulizer treatments and steroids and antibiotics.  She describes breathing somewhat better as compared to yesterday.  She denies any associated hemoptysis.  She is complaining of mid and lower back pain.  Otherwise, she has no new complaints.  In general she is pleased with her progress.  Nurses  described an uneventful night in no acute changes or events in the last 24 hours.    Review of Systems-unchanged since [place] history and physical  Objective     Vital Signs  Temp:  [97.8 °F (36.6 °C)-98.7 °F (37.1 °C)] 98.3 °F (36.8 °C)  Heart Rate:  [] 94  Resp:  [16-28] 22  BP: ()/(47-73) 134/60      Physical Exam    Constitutional: She is oriented to person, place, and time.   HENT:   Head: Normocephalic and atraumatic.   Right Ear: External ear normal.   Left Ear: External ear normal.   Eyes: Conjunctivae and EOM are normal. Pupils are equal, round, and reactive to light. Right eye exhibits no discharge. Left eye exhibits no discharge. No scleral icterus.   Neck: Normal range of motion. Neck supple. No JVD present. No tracheal deviation present. No thyromegaly present.   Cardiovascular: Normal rate, regular rhythm, normal heart sounds and intact distal pulses. Exam reveals no gallop and no friction rub.   No murmur heard.  Pulmonary/Chest: No stridor. She has no rales. She exhibits no tenderness.   Respiratory distress with any conversation. Rhonchi and wheezing bilaterally. Diminished breath sounds bilaterally. Increased fremitus bilaterally.   Abdominal: Soft. Bowel sounds are normal. She exhibits no distension and no mass. There is no tenderness. There is no guarding. No hernia.   Genitourinary:   Genitourinary Comments: No Liu catheter   Musculoskeletal: She exhibits no edema,  tenderness or deformity.   Lymphadenopathy:   She has no cervical adenopathy.   Neurological: She is alert and oriented to person, place, and time. She has normal reflexes. She displays normal reflexes. No cranial nerve deficit. She exhibits normal muscle tone. Coordination normal.   Skin: No rash noted. No erythema. No pallor.   Psychiatric: She has a normal mood and affect.   Nursing note and vitals reviewed.      Results Review:     I reviewed the patient's new clinical results  : See Below  MEDICATIONS:    ARIPiprazole 5 mg Oral Daily   aspirin 81 mg Oral Daily   atorvastatin 10 mg Oral Daily   azithromycin 500 mg Intravenous Q24H   budesonide-formoterol 2 puff Inhalation Q12H   ceftriaxone 1 g Intravenous Q24H   digoxin 125 mcg Oral Daily   diltiaZEM 120 mg Oral Daily   donepezil 10 mg Oral Nightly   enoxaparin 30 mg Subcutaneous Daily   furosemide 10 mg Oral Daily   ipratropium-albuterol 3 mL Nebulization Q6H - RT   lithium carbonate 150 mg Oral BID With Meals   LORazepam 0.5 mg Oral 2 times per day   LORazepam 1 mg Oral QAM   megestrol 200 mg Oral Daily   methylPREDNISolone sodium succinate 10 mg Intravenous Q8H   metoprolol tartrate 50 mg Oral Q12H - RT   mirtazapine 30 mg Oral Nightly   pantoprazole 40 mg Oral Daily   senna 1 tablet Oral Daily       LABS:    Lab Results   Component Value Date    GLUCOSE 167 (H) 02/18/2017    GLUCOSE 93 02/17/2017    GLUCOSE 90 01/19/2017    BUN 12 02/18/2017    CREATININE 0.50 02/18/2017    EGFRIFNONA 122 02/18/2017    EGFRIFAFRI  09/13/2016      Comment:      <15 Indicative of kidney failure.    BCR 24.0 02/18/2017    CO2 31.0 02/18/2017    CALCIUM 9.6 02/18/2017    ALBUMIN 4.10 02/18/2017    LABIL2 1.4 (L) 02/18/2017    AST 19 02/18/2017    ALT 9 (L) 02/18/2017    WBC 8.05 02/18/2017    WBC 15.11 (H) 02/17/2017    WBC 12.91 (H) 01/19/2017    HGB 11.6 (L) 02/18/2017    HCT 39.5 02/18/2017    .5 (H) 02/18/2017     02/18/2017     02/18/2017      02/17/2017     01/19/2017    K 4.2 02/18/2017    K 4.3 02/17/2017    K 4.7 01/19/2017     02/18/2017    ANIONGAP 7.0 02/18/2017       Lab Results   Component Value Date    INR 0.93 09/13/2016    INR <0.90 12/14/2015    INR 0.91 12/11/2015    PROTIME 10.5 09/13/2016    PROTIME 10.0 12/14/2015    PROTIME 10.3 12/11/2015             Assessment/Plan    Active Problems:      1) Pneumonia-left lower lobe-blood cultures obtained and are pending.  2) COPD exacerbation  3) acute on chronic respiratory failure with decreased saturations and respiratory distress from baseline.  4) early sepsis  5) lactic acidosis  6) history of paroxysmal atrial fibrillation  7) history of bipolar disorder  8) l chronic limited mobility secondary to respiratory distress  9) leukocytosis  10) DVT prophylaxis-subcutaneous Lovenox  11) stress gastritis prophylaxis-on Protonix orally.   See orders entered.     Mohamud Acosta MD  02/18/17  9:28 AM

## 2017-02-18 NOTE — ED PROVIDER NOTES
Subjective   HPI Comments: Long Hx COPD in NH, with increasing shortness of breath    Patient is a 71 y.o. female presenting with shortness of breath.   History provided by:  Patient and nursing home   used: No    Shortness of Breath   Severity:  Severe  Onset quality:  Gradual  Duration:  1 day  Timing:  Constant  Progression:  Worsening  Chronicity:  Chronic  Context: activity    Relieved by:  Nothing  Worsened by:  Exertion and coughing  Ineffective treatments:  None tried  Associated symptoms: chest pain, cough, sputum production and wheezing    Associated symptoms: no abdominal pain and no fever        Review of Systems   Constitutional: Negative.  Negative for fever.   HENT: Negative.    Respiratory: Positive for cough, sputum production, shortness of breath and wheezing.    Cardiovascular: Positive for chest pain.   Gastrointestinal: Negative.  Negative for abdominal pain.   Endocrine: Negative.    Genitourinary: Negative.  Negative for dysuria.   Skin: Negative.    Neurological: Negative.    Psychiatric/Behavioral: Negative.    All other systems reviewed and are negative.      Past Medical History   Diagnosis Date   • Anxiety    • COPD (chronic obstructive pulmonary disease)    • Depression    • GERD (gastroesophageal reflux disease)    • Hyperlipidemia    • Hypertension        No Known Allergies    Past Surgical History   Procedure Laterality Date   • Hip fracture surgery Right    • Abdominal surgery     • Cholecystectomy     • Appendectomy         History reviewed. No pertinent family history.    Social History     Social History   • Marital status:      Spouse name: N/A   • Number of children: N/A   • Years of education: N/A     Social History Main Topics   • Smoking status: Former Smoker     Quit date: 1/8/2015   • Smokeless tobacco: None   • Alcohol use No   • Drug use: No   • Sexual activity: Defer     Other Topics Concern   • None     Social History Narrative            Objective   Physical Exam   Constitutional: She is oriented to person, place, and time. She appears well-developed and well-nourished. No distress.   HENT:   Head: Normocephalic and atraumatic.   Right Ear: External ear normal.   Left Ear: External ear normal.   Nose: Nose normal.   Eyes: Conjunctivae and EOM are normal. Pupils are equal, round, and reactive to light.   Neck: Normal range of motion. Neck supple. No JVD present. No tracheal deviation present.   Cardiovascular: Normal rate, regular rhythm and normal heart sounds.    No murmur heard.  Pulmonary/Chest: Accessory muscle usage present. Tachypnea noted. She is in respiratory distress. She has wheezes in the right middle field, the right lower field, the left middle field and the left lower field.   Abdominal: Soft. Bowel sounds are normal. There is no tenderness.   Musculoskeletal: Normal range of motion. She exhibits no edema or deformity.   Neurological: She is alert and oriented to person, place, and time. No cranial nerve deficit.   Skin: Skin is warm and dry. No rash noted. She is not diaphoretic. No erythema. No pallor.   Psychiatric: She has a normal mood and affect. Her behavior is normal. Thought content normal.   Nursing note and vitals reviewed.      Procedures         ED Course  ED Course      Discussed with Dr Acosta will admit            MDM    Final diagnoses:   Pneumonia of left lower lobe due to infectious organism   Sepsis, due to unspecified organism   Hypoxia            Chance Parra MD  02/18/17 0536

## 2017-02-18 NOTE — PLAN OF CARE
Problem: Fluid Volume Deficit (Adult)  Intervention: Monitor/Manage Hypovolemia    02/18/17 0720   Coping/Psychosocial Interventions   Environmental Support calm environment promoted;distractions minimized   Nutrition Interventions   Fluid/Electrolyte Management fluids provided         Goal: Identify Related Risk Factors and Signs and Symptoms  Outcome: Ongoing (interventions implemented as appropriate)    02/18/17 0244   Fluid Volume Deficit   Fluid Volume Deficit: Related Risk Factors age extremes;functional decline;infection/sepsis   Signs and Symptoms (Fluid Volume Deficit) muscle weakness;skin turgor decreased       Goal: Fluid/Electrolyte Balance  Outcome: Ongoing (interventions implemented as appropriate)    02/18/17 0244   Fluid Volume Deficit (Adult)   Fluid/Electrolyte Balance making progress toward outcome       Goal: Comfort/Well Being  Outcome: Ongoing (interventions implemented as appropriate)    02/18/17 0244   Fluid Volume Deficit (Adult)   Comfort/Well Being making progress toward outcome         Problem: Fall Risk (Adult)  Intervention: Monitor/Assist with Self Care    02/18/17 0720 02/18/17 1100   Monitor/Assist with Self Care   Ambulation 3-->assistive equipment and person --    Transferring 3-->assistive equipment and person --    Toileting 2-->assistive person --    Bathing 2-->assistive person --    Dressing 2-->assistive person --    Eating 0-->independent --    Communication 0-->understands/communicates without difficulty --    Swallowing (if score 2 or more for any item, consult Rehab Services) 0-->swallows foods/liquids without difficulty --    Activity   Activity Type --  activity adjusted per tolerance   Activity Level of Assistance --  assistance, 1 person   Musculoskeletal Interventions   Self-Care Promotion independence encouraged;BADL personal objects within reach;meal setup provided --        Intervention: Reduce Risk/Promote Restraint Free Environment    02/18/17 1400   Safety  Interventions   Safety/Security Measures bed alarm set   Environmental Safety Modification assistive device/personal items within reach;clutter free environment maintained;room near unit station   Restraint Interventions   Safety Promotion/Fall Prevention safety round/check completed;fall prevention program maintained       Intervention: Review Medications/Identify Contributors to Fall Risk    02/18/17 1400   Safety Interventions   Medication Review/Management medications reviewed         Goal: Identify Related Risk Factors and Signs and Symptoms  Outcome: Ongoing (interventions implemented as appropriate)    02/17/17 2007   Fall Risk   Fall Risk: Related Risk Factors age-related changes;fatigue/slow reaction   Fall Risk: Signs and Symptoms presence of risk factors       Goal: Absence of Falls  Outcome: Ongoing (interventions implemented as appropriate)    02/18/17 0244   Fall Risk (Adult)   Absence of Falls making progress toward outcome         Problem: Respiratory Insufficiency (Adult)  Intervention: Provide Oxygenation/Ventilation/Perfusion Support    02/18/17 0720 02/18/17 1100 02/18/17 1400   Activity   Activity Type --  activity adjusted per tolerance --    Safety Interventions   Medication Review/Management --  --  medications reviewed   Positioning   Head Of Bed (HOB) Position --  HOB elevated --    Respiratory Interventions   Airway/Ventilation Management airway patency maintained --  --        Intervention: Optimize/Manage Energy Expenditure    02/18/17 0720   Coping/Psychosocial Interventions   Environmental Support calm environment promoted;distractions minimized   Nutrition Interventions   Oral Nutrition Promotion rest periods promoted   Pain/Comfort/Sleep Interventions   Sleep/Rest Enhancement awakenings minimized;noise level reduced;regular sleep/rest pattern promoted;relaxation techniques promoted         Goal: Identify Related Risk Factors and Signs and Symptoms  Outcome: Ongoing (interventions  implemented as appropriate)    02/17/17 2007   Respiratory Insufficiency   Related Risk Factors (Respiratory Insufficiency) activity intolerance   Signs and Symptoms (Respiratory Insufficiency) shortness of breath;sleeplessness/fatigue       Goal: Acid/Base Balance  Outcome: Ongoing (interventions implemented as appropriate)    02/17/17 2007   Respiratory Insufficiency (Adult)   Acid/Base Balance making progress toward outcome       Goal: Effective Ventilation  Outcome: Ongoing (interventions implemented as appropriate)    Problem: Patient Care Overview (Adult)  Goal: Plan of Care Review  Outcome: Ongoing (interventions implemented as appropriate)    02/18/17 0720   Coping/Psychosocial Response Interventions   Plan Of Care Reviewed With patient       Goal: Adult Individualization and Mutuality  Outcome: Ongoing (interventions implemented as appropriate)  Goal: Discharge Needs Assessment  Outcome: Ongoing (interventions implemented as appropriate)    02/17/17 1235 02/17/17 1621 02/17/17 1800   Discharge Needs Assessment   Concerns To Be Addressed transportation;discharge planning concerns --  --    Readmission Within The Last 30 Days no previous admission in last 30 days  (LAST ADMIT 7/8/16 FOR SOA) --  --    Discharge Disposition --  skilled nursing facility  (SS contacted [facility] per Demi who states pt has a 14 day skilled bed hold. ) --    Discharge Planning Comments SOCIAL SERVICE CONSULT PLACED R/T DISCHARGE PLANNIG AND TRANSPORTATION. PT IS A RESIDENT AT [facility], BEING ADMITTED TO [facility] TO DR VARGAS FOR PNEUMONIA ON VENIMASK. --  --    Living Environment   Transportation Available --  --  none   Self-Care   Equipment Currently Used at Home --  --  oxygen;wheelchair

## 2017-02-18 NOTE — PLAN OF CARE
Problem: Fluid Volume Deficit (Adult)  Goal: Identify Related Risk Factors and Signs and Symptoms  Outcome: Ongoing (interventions implemented as appropriate)    02/17/17 2007   Fluid Volume Deficit   Fluid Volume Deficit: Related Risk Factors fluid access         Problem: Fall Risk (Adult)  Goal: Identify Related Risk Factors and Signs and Symptoms  Outcome: Ongoing (interventions implemented as appropriate)    02/17/17 2007   Fall Risk   Fall Risk: Related Risk Factors age-related changes;fatigue/slow reaction   Fall Risk: Signs and Symptoms presence of risk factors         Problem: Respiratory Insufficiency (Adult)  Goal: Identify Related Risk Factors and Signs and Symptoms  Outcome: Ongoing (interventions implemented as appropriate)    02/17/17 2007   Respiratory Insufficiency   Related Risk Factors (Respiratory Insufficiency) activity intolerance   Signs and Symptoms (Respiratory Insufficiency) shortness of breath;sleeplessness/fatigue       Goal: Acid/Base Balance  Outcome: Ongoing (interventions implemented as appropriate)    02/17/17 2007   Respiratory Insufficiency (Adult)   Acid/Base Balance making progress toward outcome

## 2017-02-18 NOTE — PLAN OF CARE
Problem: Fluid Volume Deficit (Adult)  Goal: Identify Related Risk Factors and Signs and Symptoms  Outcome: Ongoing (interventions implemented as appropriate)    02/18/17 0244   Fluid Volume Deficit   Fluid Volume Deficit: Related Risk Factors age extremes;functional decline;infection/sepsis   Signs and Symptoms (Fluid Volume Deficit) muscle weakness;skin turgor decreased       Goal: Fluid/Electrolyte Balance  Outcome: Ongoing (interventions implemented as appropriate)    02/18/17 0244   Fluid Volume Deficit (Adult)   Fluid/Electrolyte Balance making progress toward outcome       Goal: Comfort/Well Being  Outcome: Ongoing (interventions implemented as appropriate)    02/18/17 0244   Fluid Volume Deficit (Adult)   Comfort/Well Being making progress toward outcome         Problem: Fall Risk (Adult)  Goal: Identify Related Risk Factors and Signs and Symptoms  Outcome: Ongoing (interventions implemented as appropriate)    02/17/17 2007   Fall Risk   Fall Risk: Related Risk Factors age-related changes;fatigue/slow reaction   Fall Risk: Signs and Symptoms presence of risk factors       Goal: Absence of Falls  Outcome: Ongoing (interventions implemented as appropriate)    02/18/17 0244   Fall Risk (Adult)   Absence of Falls making progress toward outcome

## 2017-02-19 LAB
ALBUMIN SERPL-MCNC: 3.9 G/DL (ref 3.4–4.8)
ALBUMIN/GLOB SERPL: 1.5 G/DL (ref 1.5–2.5)
ALP SERPL-CCNC: 43 U/L (ref 35–104)
ALT SERPL W P-5'-P-CCNC: 14 U/L (ref 10–36)
ANION GAP SERPL CALCULATED.3IONS-SCNC: 4.3 MMOL/L (ref 3.6–11.2)
AST SERPL-CCNC: 24 U/L (ref 10–30)
BASOPHILS # BLD AUTO: 0.01 10*3/MM3 (ref 0–0.3)
BASOPHILS NFR BLD AUTO: 0.1 % (ref 0–2)
BILIRUB SERPL-MCNC: 0.2 MG/DL (ref 0.2–1.8)
BNP SERPL-MCNC: 502 PG/ML (ref 0–100)
BUN BLD-MCNC: 15 MG/DL (ref 7–21)
BUN/CREAT SERPL: 42.9 (ref 7–25)
CALCIUM SPEC-SCNC: 9.6 MG/DL (ref 7.7–10)
CHLORIDE SERPL-SCNC: 108 MMOL/L (ref 99–112)
CO2 SERPL-SCNC: 31.7 MMOL/L (ref 24.3–31.9)
CREAT BLD-MCNC: 0.35 MG/DL (ref 0.43–1.29)
CRP SERPL-MCNC: 2.75 MG/DL (ref 0–0.99)
DEPRECATED RDW RBC AUTO: 49.2 FL (ref 37–54)
EOSINOPHIL # BLD AUTO: 0 10*3/MM3 (ref 0–0.7)
EOSINOPHIL NFR BLD AUTO: 0 % (ref 0–7)
ERYTHROCYTE [DISTWIDTH] IN BLOOD BY AUTOMATED COUNT: 13.6 % (ref 11.5–14.5)
GFR SERPL CREATININE-BSD FRML MDRD: >150 ML/MIN/1.73
GLOBULIN UR ELPH-MCNC: 2.6 GM/DL
GLUCOSE BLD-MCNC: 122 MG/DL (ref 70–110)
HCT VFR BLD AUTO: 39.1 % (ref 37–47)
HGB BLD-MCNC: 12 G/DL (ref 12–16)
IMM GRANULOCYTES # BLD: 0.06 10*3/MM3 (ref 0–0.03)
IMM GRANULOCYTES NFR BLD: 0.4 % (ref 0–0.5)
LYMPHOCYTES # BLD AUTO: 0.76 10*3/MM3 (ref 1–3)
LYMPHOCYTES NFR BLD AUTO: 4.4 % (ref 16–46)
MAGNESIUM SERPL-MCNC: 2.4 MG/DL (ref 1.7–2.6)
MCH RBC QN AUTO: 30.3 PG (ref 27–33)
MCHC RBC AUTO-ENTMCNC: 30.7 G/DL (ref 33–37)
MCV RBC AUTO: 98.7 FL (ref 80–94)
MONOCYTES # BLD AUTO: 0.57 10*3/MM3 (ref 0.1–0.9)
MONOCYTES NFR BLD AUTO: 3.3 % (ref 0–12)
NEUTROPHILS # BLD AUTO: 15.7 10*3/MM3 (ref 1.4–6.5)
NEUTROPHILS NFR BLD AUTO: 91.8 % (ref 40–75)
OSMOLALITY SERPL CALC.SUM OF ELEC: 289 MOSM/KG (ref 273–305)
PHOSPHATE SERPL-MCNC: 3.1 MG/DL (ref 2.7–4.5)
PLATELET # BLD AUTO: 225 10*3/MM3 (ref 130–400)
PMV BLD AUTO: 10.7 FL (ref 6–10)
POTASSIUM BLD-SCNC: 4.2 MMOL/L (ref 3.5–5.3)
PROT SERPL-MCNC: 6.5 G/DL (ref 6–8)
RBC # BLD AUTO: 3.96 10*6/MM3 (ref 4.2–5.4)
SODIUM BLD-SCNC: 144 MMOL/L (ref 135–153)
WBC NRBC COR # BLD: 17.1 10*3/MM3 (ref 4.5–12.5)

## 2017-02-19 PROCEDURE — 25010000002 METHYLPREDNISOLONE PER 40 MG: Performed by: INTERNAL MEDICINE

## 2017-02-19 PROCEDURE — 94640 AIRWAY INHALATION TREATMENT: CPT

## 2017-02-19 PROCEDURE — 83735 ASSAY OF MAGNESIUM: CPT | Performed by: INTERNAL MEDICINE

## 2017-02-19 PROCEDURE — 25010000002 ENOXAPARIN PER 10 MG: Performed by: INTERNAL MEDICINE

## 2017-02-19 PROCEDURE — 63710000001 PREDNISONE PER 5 MG: Performed by: INTERNAL MEDICINE

## 2017-02-19 PROCEDURE — 25010000002 CEFTRIAXONE: Performed by: INTERNAL MEDICINE

## 2017-02-19 PROCEDURE — 80053 COMPREHEN METABOLIC PANEL: CPT | Performed by: INTERNAL MEDICINE

## 2017-02-19 PROCEDURE — 86140 C-REACTIVE PROTEIN: CPT | Performed by: INTERNAL MEDICINE

## 2017-02-19 PROCEDURE — 94799 UNLISTED PULMONARY SVC/PX: CPT

## 2017-02-19 PROCEDURE — 83880 ASSAY OF NATRIURETIC PEPTIDE: CPT | Performed by: INTERNAL MEDICINE

## 2017-02-19 PROCEDURE — 85025 COMPLETE CBC W/AUTO DIFF WBC: CPT | Performed by: INTERNAL MEDICINE

## 2017-02-19 PROCEDURE — 25010000002 AZITHROMYCIN: Performed by: INTERNAL MEDICINE

## 2017-02-19 PROCEDURE — 84100 ASSAY OF PHOSPHORUS: CPT | Performed by: INTERNAL MEDICINE

## 2017-02-19 RX ORDER — PREDNISONE 10 MG/1
10 TABLET ORAL
Status: DISCONTINUED | OUTPATIENT
Start: 2017-02-19 | End: 2017-02-20

## 2017-02-19 RX ADMIN — LITHIUM CARBONATE 150 MG: 150 CAPSULE, GELATIN COATED ORAL at 08:17

## 2017-02-19 RX ADMIN — PREDNISONE 10 MG: 10 TABLET ORAL at 12:40

## 2017-02-19 RX ADMIN — HYDROCODONE BITARTRATE AND ACETAMINOPHEN 1 TABLET: 5; 325 TABLET ORAL at 00:42

## 2017-02-19 RX ADMIN — BUDESONIDE AND FORMOTEROL FUMARATE DIHYDRATE 2 PUFF: 160; 4.5 AEROSOL RESPIRATORY (INHALATION) at 19:35

## 2017-02-19 RX ADMIN — IPRATROPIUM BROMIDE AND ALBUTEROL SULFATE 3 ML: .5; 3 SOLUTION RESPIRATORY (INHALATION) at 12:54

## 2017-02-19 RX ADMIN — LITHIUM CARBONATE 150 MG: 150 CAPSULE, GELATIN COATED ORAL at 17:38

## 2017-02-19 RX ADMIN — ARIPIPRAZOLE 5 MG: 10 TABLET ORAL at 08:17

## 2017-02-19 RX ADMIN — AZITHROMYCIN 500 MG: 500 INJECTION, POWDER, LYOPHILIZED, FOR SOLUTION INTRAVENOUS at 12:41

## 2017-02-19 RX ADMIN — FUROSEMIDE 10 MG: 20 TABLET ORAL at 08:16

## 2017-02-19 RX ADMIN — LORAZEPAM 1 MG: 1 TABLET ORAL at 06:11

## 2017-02-19 RX ADMIN — ASPIRIN 81 MG: 81 TABLET, CHEWABLE ORAL at 08:16

## 2017-02-19 RX ADMIN — MIRTAZAPINE 30 MG: 15 TABLET, FILM COATED ORAL at 20:27

## 2017-02-19 RX ADMIN — SENNOSIDES 1 TABLET: 8.6 TABLET ORAL at 08:17

## 2017-02-19 RX ADMIN — HYDROCODONE BITARTRATE AND ACETAMINOPHEN 1 TABLET: 5; 325 TABLET ORAL at 13:27

## 2017-02-19 RX ADMIN — ENOXAPARIN SODIUM 30 MG: 30 INJECTION SUBCUTANEOUS at 08:17

## 2017-02-19 RX ADMIN — IPRATROPIUM BROMIDE AND ALBUTEROL SULFATE 3 ML: .5; 3 SOLUTION RESPIRATORY (INHALATION) at 00:35

## 2017-02-19 RX ADMIN — LORAZEPAM 0.5 MG: 0.5 TABLET ORAL at 20:27

## 2017-02-19 RX ADMIN — IPRATROPIUM BROMIDE AND ALBUTEROL SULFATE 3 ML: .5; 3 SOLUTION RESPIRATORY (INHALATION) at 07:17

## 2017-02-19 RX ADMIN — MEGESTROL ACETATE 200 MG: 40 SUSPENSION ORAL at 08:18

## 2017-02-19 RX ADMIN — DILTIAZEM HYDROCHLORIDE 120 MG: 60 TABLET, FILM COATED ORAL at 08:17

## 2017-02-19 RX ADMIN — BUDESONIDE AND FORMOTEROL FUMARATE DIHYDRATE 2 PUFF: 160; 4.5 AEROSOL RESPIRATORY (INHALATION) at 07:17

## 2017-02-19 RX ADMIN — DIGOXIN 125 MCG: 125 TABLET ORAL at 12:40

## 2017-02-19 RX ADMIN — PANTOPRAZOLE SODIUM 40 MG: 40 TABLET, DELAYED RELEASE ORAL at 08:17

## 2017-02-19 RX ADMIN — DONEPEZIL HYDROCHLORIDE 10 MG: 5 TABLET, FILM COATED ORAL at 20:27

## 2017-02-19 RX ADMIN — HYDROCODONE BITARTRATE AND ACETAMINOPHEN 1 TABLET: 5; 325 TABLET ORAL at 20:26

## 2017-02-19 RX ADMIN — LORAZEPAM 0.5 MG: 0.5 TABLET ORAL at 12:40

## 2017-02-19 RX ADMIN — CEFTRIAXONE 1 G: 1 INJECTION, POWDER, FOR SOLUTION INTRAMUSCULAR; INTRAVENOUS at 12:41

## 2017-02-19 RX ADMIN — ATORVASTATIN CALCIUM 10 MG: 10 TABLET, FILM COATED ORAL at 08:17

## 2017-02-19 RX ADMIN — METOPROLOL TARTRATE 50 MG: 50 TABLET, FILM COATED ORAL at 08:17

## 2017-02-19 RX ADMIN — METOPROLOL TARTRATE 50 MG: 50 TABLET, FILM COATED ORAL at 20:27

## 2017-02-19 RX ADMIN — IPRATROPIUM BROMIDE AND ALBUTEROL SULFATE 3 ML: .5; 3 SOLUTION RESPIRATORY (INHALATION) at 19:35

## 2017-02-19 RX ADMIN — HYDROCODONE BITARTRATE AND ACETAMINOPHEN 1 TABLET: 5; 325 TABLET ORAL at 06:57

## 2017-02-19 RX ADMIN — METHYLPREDNISOLONE SODIUM SUCCINATE 10 MG: 40 INJECTION, POWDER, FOR SOLUTION INTRAMUSCULAR; INTRAVENOUS at 03:15

## 2017-02-19 NOTE — PROGRESS NOTES
LOS: 2 days       Chief Complaint :   Shortness of breath     Subjective     Interval History:     Patient Complaints:  Shona Aguilar  continues with some intermittent shortness of breath and cough.  She describes an improvement in  wheezing.  She continues with minimal productive cough.  She otherwise describes a slow gradual improvement.  Otherwise has had an uneventful night in no acute changes.  has recently been admitted for COPD exacerbation and pneumonia.  She continues on nebulizer treatments and steroids and antibiotics. Otherwise, she has no new complaints.  In general she is pleased with her progress.  Nurses  described an uneventful night in no acute changes or events in the last 24 hours.    Review of Systems-unchanged since admission history and physical  Objective     Vital Signs  Temp:  [98 °F (36.7 °C)-98.9 °F (37.2 °C)] 98 °F (36.7 °C)  Heart Rate:  [] 84  Resp:  [18-22] 20  BP: (112-154)/(59-82) 113/59      Physical Exam    Constitutional: She is oriented to person, place, and time.   HENT:   Head: Normocephalic and atraumatic.   Right Ear: External ear normal.   Left Ear: External ear normal.   Eyes: Conjunctivae and EOM are normal. Pupils are equal, round, and reactive to light. Right eye exhibits no discharge. Left eye exhibits no discharge. No scleral icterus.   Neck: Normal range of motion. Neck supple. No JVD present. No tracheal deviation present. No thyromegaly present.   Cardiovascular: Normal rate, regular rhythm, normal heart sounds and intact distal pulses. Exam reveals no gallop and no friction rub.   No murmur heard.  Pulmonary/Chest: No stridor. She has no rales. She exhibits no tenderness.   Respiratory distress with any conversation. Rhonchi and wheezing bilaterally. Diminished breath sounds bilaterally. Increased fremitus bilaterally.   Abdominal: Soft. Bowel sounds are normal. She exhibits no distension and no mass. There is no tenderness. There is no guarding. No  hernia.   Genitourinary:   Genitourinary Comments: No Liu catheter   Musculoskeletal: She exhibits no edema, tenderness or deformity.   Lymphadenopathy:   She has no cervical adenopathy.   Neurological: She is alert and oriented to person, place, and time. She has normal reflexes. She displays normal reflexes. No cranial nerve deficit. She exhibits normal muscle tone. Coordination normal.   Skin: No rash noted. No erythema. No pallor.   Psychiatric: She has a normal mood and affect.   Nursing note and vitals reviewed.      Results Review:     I reviewed the patient's new clinical results  : See Below  MEDICATIONS:    ARIPiprazole 5 mg Oral Daily   aspirin 81 mg Oral Daily   atorvastatin 10 mg Oral Daily   azithromycin 500 mg Intravenous Q24H   budesonide-formoterol 2 puff Inhalation Q12H   ceftriaxone 1 g Intravenous Q24H   digoxin 125 mcg Oral Daily   diltiaZEM 120 mg Oral Daily   donepezil 10 mg Oral Nightly   enoxaparin 30 mg Subcutaneous Daily   furosemide 10 mg Oral Daily   ipratropium-albuterol 3 mL Nebulization Q6H - RT   lithium carbonate 150 mg Oral BID With Meals   LORazepam 0.5 mg Oral 2 times per day   LORazepam 1 mg Oral QAM   megestrol 200 mg Oral Daily   methylPREDNISolone sodium succinate 10 mg Intravenous Q8H   metoprolol tartrate 50 mg Oral Q12H - RT   mirtazapine 30 mg Oral Nightly   pantoprazole 40 mg Oral Daily   senna 1 tablet Oral Daily       LABS:    Lab Results   Component Value Date    GLUCOSE 122 (H) 02/19/2017    GLUCOSE 167 (H) 02/18/2017    GLUCOSE 93 02/17/2017    BUN 15 02/19/2017    CREATININE 0.35 (L) 02/19/2017    EGFRIFNONA >150 02/19/2017    EGFRIFAFRI  09/13/2016      Comment:      <15 Indicative of kidney failure.    BCR 42.9 (H) 02/19/2017    CO2 31.7 02/19/2017    CALCIUM 9.6 02/19/2017    ALBUMIN 3.90 02/19/2017    LABIL2 1.5 02/19/2017    AST 24 02/19/2017    ALT 14 02/19/2017    WBC 17.10 (H) 02/19/2017    WBC 8.05 02/18/2017    WBC 15.11 (H) 02/17/2017    HGB 12.0  02/19/2017    HCT 39.1 02/19/2017    MCV 98.7 (H) 02/19/2017     02/19/2017     02/19/2017     02/18/2017     02/17/2017    K 4.2 02/19/2017    K 4.2 02/18/2017    K 4.3 02/17/2017     02/19/2017    ANIONGAP 4.3 02/19/2017       Lab Results   Component Value Date    INR 0.93 09/13/2016    INR <0.90 12/14/2015    INR 0.91 12/11/2015    PROTIME 10.5 09/13/2016    PROTIME 10.0 12/14/2015    PROTIME 10.3 12/11/2015             Assessment/Plan    Active Problems:      1) Pneumonia-left lower lobe-blood cultures obtained and are pending.  2) COPD exacerbation  3) acute on chronic respiratory failure with decreased saturations and respiratory distress from baseline.  4) early sepsis  5) lactic acidosis  6) history of paroxysmal atrial fibrillation  7) history of bipolar disorder  8) l chronic limited mobility secondary to respiratory distress  9) leukocytosis  10) DVT prophylaxis-subcutaneous Lovenox  11) stress gastritis prophylaxis-on Protonix orally.   See orders entered.     Mohamud Acosta MD  02/19/17  9:05 AM

## 2017-02-19 NOTE — PLAN OF CARE
Problem: Fluid Volume Deficit (Adult)  Goal: Identify Related Risk Factors and Signs and Symptoms  Outcome: Ongoing (interventions implemented as appropriate)    02/18/17 0244   Fluid Volume Deficit   Fluid Volume Deficit: Related Risk Factors age extremes;functional decline;infection/sepsis   Signs and Symptoms (Fluid Volume Deficit) muscle weakness;skin turgor decreased       Goal: Fluid/Electrolyte Balance  Outcome: Ongoing (interventions implemented as appropriate)    02/19/17 0053   Fluid Volume Deficit (Adult)   Fluid/Electrolyte Balance making progress toward outcome       Goal: Comfort/Well Being  Outcome: Ongoing (interventions implemented as appropriate)    02/19/17 0053   Fluid Volume Deficit (Adult)   Comfort/Well Being making progress toward outcome         Problem: Fall Risk (Adult)  Goal: Identify Related Risk Factors and Signs and Symptoms  Outcome: Ongoing (interventions implemented as appropriate)    02/17/17 2007   Fall Risk   Fall Risk: Related Risk Factors age-related changes;fatigue/slow reaction   Fall Risk: Signs and Symptoms presence of risk factors       Goal: Absence of Falls  Outcome: Ongoing (interventions implemented as appropriate)    02/19/17 0053   Fall Risk (Adult)   Absence of Falls making progress toward outcome         Problem: Respiratory Insufficiency (Adult)  Goal: Identify Related Risk Factors and Signs and Symptoms  Outcome: Ongoing (interventions implemented as appropriate)    02/17/17 2007   Respiratory Insufficiency   Related Risk Factors (Respiratory Insufficiency) activity intolerance   Signs and Symptoms (Respiratory Insufficiency) shortness of breath;sleeplessness/fatigue       Goal: Acid/Base Balance  Outcome: Ongoing (interventions implemented as appropriate)    02/19/17 0053   Respiratory Insufficiency (Adult)   Acid/Base Balance making progress toward outcome       Goal: Effective Ventilation  Outcome: Ongoing (interventions implemented as appropriate)    02/19/17  0053   Respiratory Insufficiency (Adult)   Effective Ventilation making progress toward outcome         Problem: Patient Care Overview (Adult)  Goal: Plan of Care Review  Outcome: Ongoing (interventions implemented as appropriate)    02/19/17 0053   Coping/Psychosocial Response Interventions   Plan Of Care Reviewed With patient   Patient Care Overview   Progress progress toward functional goals as expected       Goal: Adult Individualization and Mutuality  Outcome: Ongoing (interventions implemented as appropriate)

## 2017-02-20 LAB
BASOPHILS # BLD AUTO: 0.01 10*3/MM3 (ref 0–0.3)
BASOPHILS NFR BLD AUTO: 0.1 % (ref 0–2)
CRP SERPL-MCNC: 1.23 MG/DL (ref 0–0.99)
DEPRECATED RDW RBC AUTO: 49.2 FL (ref 37–54)
EOSINOPHIL # BLD AUTO: 0.01 10*3/MM3 (ref 0–0.7)
EOSINOPHIL NFR BLD AUTO: 0.1 % (ref 0–7)
ERYTHROCYTE [DISTWIDTH] IN BLOOD BY AUTOMATED COUNT: 13.6 % (ref 11.5–14.5)
HCT VFR BLD AUTO: 41 % (ref 37–47)
HGB BLD-MCNC: 12.1 G/DL (ref 12–16)
IMM GRANULOCYTES # BLD: 0.06 10*3/MM3 (ref 0–0.03)
IMM GRANULOCYTES NFR BLD: 0.4 % (ref 0–0.5)
LYMPHOCYTES # BLD AUTO: 1.17 10*3/MM3 (ref 1–3)
LYMPHOCYTES NFR BLD AUTO: 8.7 % (ref 16–46)
MCH RBC QN AUTO: 29.9 PG (ref 27–33)
MCHC RBC AUTO-ENTMCNC: 29.5 G/DL (ref 33–37)
MCV RBC AUTO: 101.2 FL (ref 80–94)
MONOCYTES # BLD AUTO: 1.1 10*3/MM3 (ref 0.1–0.9)
MONOCYTES NFR BLD AUTO: 8.2 % (ref 0–12)
NEUTROPHILS # BLD AUTO: 11.07 10*3/MM3 (ref 1.4–6.5)
NEUTROPHILS NFR BLD AUTO: 82.5 % (ref 40–75)
PLATELET # BLD AUTO: 234 10*3/MM3 (ref 130–400)
PMV BLD AUTO: 10.2 FL (ref 6–10)
RBC # BLD AUTO: 4.05 10*6/MM3 (ref 4.2–5.4)
WBC NRBC COR # BLD: 13.42 10*3/MM3 (ref 4.5–12.5)

## 2017-02-20 PROCEDURE — 94799 UNLISTED PULMONARY SVC/PX: CPT

## 2017-02-20 PROCEDURE — 86140 C-REACTIVE PROTEIN: CPT | Performed by: INTERNAL MEDICINE

## 2017-02-20 PROCEDURE — 63710000001 PREDNISONE PER 5 MG: Performed by: INTERNAL MEDICINE

## 2017-02-20 PROCEDURE — 25010000002 ENOXAPARIN PER 10 MG: Performed by: INTERNAL MEDICINE

## 2017-02-20 PROCEDURE — 85025 COMPLETE CBC W/AUTO DIFF WBC: CPT | Performed by: INTERNAL MEDICINE

## 2017-02-20 PROCEDURE — 94640 AIRWAY INHALATION TREATMENT: CPT

## 2017-02-20 RX ORDER — CEFPODOXIME PROXETIL 200 MG/1
200 TABLET, FILM COATED ORAL EVERY 12 HOURS SCHEDULED
Status: DISCONTINUED | OUTPATIENT
Start: 2017-02-20 | End: 2017-02-22

## 2017-02-20 RX ORDER — PREDNISONE 20 MG/1
60 TABLET ORAL
Status: DISCONTINUED | OUTPATIENT
Start: 2017-02-20 | End: 2017-02-22

## 2017-02-20 RX ORDER — BUDESONIDE 0.5 MG/2ML
0.5 INHALANT ORAL
Status: DISCONTINUED | OUTPATIENT
Start: 2017-02-20 | End: 2017-02-22 | Stop reason: HOSPADM

## 2017-02-20 RX ADMIN — FUROSEMIDE 10 MG: 20 TABLET ORAL at 08:54

## 2017-02-20 RX ADMIN — DIGOXIN 125 MCG: 125 TABLET ORAL at 11:29

## 2017-02-20 RX ADMIN — LORAZEPAM 1 MG: 1 TABLET ORAL at 06:34

## 2017-02-20 RX ADMIN — BUDESONIDE 0.5 MG: 0.5 INHALANT RESPIRATORY (INHALATION) at 19:20

## 2017-02-20 RX ADMIN — MEGESTROL ACETATE 200 MG: 40 SUSPENSION ORAL at 08:54

## 2017-02-20 RX ADMIN — METOPROLOL TARTRATE 50 MG: 50 TABLET, FILM COATED ORAL at 20:14

## 2017-02-20 RX ADMIN — CEFPODOXIME PROXETIL 200 MG: 200 TABLET, FILM COATED ORAL at 20:14

## 2017-02-20 RX ADMIN — SENNOSIDES 1 TABLET: 8.6 TABLET ORAL at 08:53

## 2017-02-20 RX ADMIN — BUDESONIDE AND FORMOTEROL FUMARATE DIHYDRATE 2 PUFF: 160; 4.5 AEROSOL RESPIRATORY (INHALATION) at 06:11

## 2017-02-20 RX ADMIN — ARIPIPRAZOLE 5 MG: 10 TABLET ORAL at 08:53

## 2017-02-20 RX ADMIN — HYDROCODONE BITARTRATE AND ACETAMINOPHEN 1 TABLET: 5; 325 TABLET ORAL at 09:44

## 2017-02-20 RX ADMIN — CEFPODOXIME PROXETIL 200 MG: 200 TABLET, FILM COATED ORAL at 08:53

## 2017-02-20 RX ADMIN — ASPIRIN 81 MG: 81 TABLET, CHEWABLE ORAL at 08:53

## 2017-02-20 RX ADMIN — HYDROCODONE BITARTRATE AND ACETAMINOPHEN 1 TABLET: 5; 325 TABLET ORAL at 03:34

## 2017-02-20 RX ADMIN — IPRATROPIUM BROMIDE AND ALBUTEROL SULFATE 3 ML: .5; 3 SOLUTION RESPIRATORY (INHALATION) at 19:09

## 2017-02-20 RX ADMIN — LORAZEPAM 0.5 MG: 0.5 TABLET ORAL at 20:14

## 2017-02-20 RX ADMIN — PREDNISONE 60 MG: 20 TABLET ORAL at 08:53

## 2017-02-20 RX ADMIN — IPRATROPIUM BROMIDE AND ALBUTEROL SULFATE 3 ML: .5; 3 SOLUTION RESPIRATORY (INHALATION) at 06:11

## 2017-02-20 RX ADMIN — LITHIUM CARBONATE 150 MG: 150 CAPSULE, GELATIN COATED ORAL at 08:53

## 2017-02-20 RX ADMIN — ATORVASTATIN CALCIUM 10 MG: 10 TABLET, FILM COATED ORAL at 08:53

## 2017-02-20 RX ADMIN — LITHIUM CARBONATE 150 MG: 150 CAPSULE, GELATIN COATED ORAL at 17:22

## 2017-02-20 RX ADMIN — LORAZEPAM 0.5 MG: 0.5 TABLET ORAL at 11:29

## 2017-02-20 RX ADMIN — ENOXAPARIN SODIUM 30 MG: 30 INJECTION SUBCUTANEOUS at 08:53

## 2017-02-20 RX ADMIN — IPRATROPIUM BROMIDE AND ALBUTEROL SULFATE 3 ML: .5; 3 SOLUTION RESPIRATORY (INHALATION) at 13:31

## 2017-02-20 RX ADMIN — MIRTAZAPINE 30 MG: 15 TABLET, FILM COATED ORAL at 20:14

## 2017-02-20 RX ADMIN — DILTIAZEM HYDROCHLORIDE 120 MG: 60 TABLET, FILM COATED ORAL at 08:53

## 2017-02-20 RX ADMIN — DONEPEZIL HYDROCHLORIDE 10 MG: 5 TABLET, FILM COATED ORAL at 20:14

## 2017-02-20 RX ADMIN — HYDROCODONE BITARTRATE AND ACETAMINOPHEN 1 TABLET: 5; 325 TABLET ORAL at 15:59

## 2017-02-20 RX ADMIN — METOPROLOL TARTRATE 50 MG: 50 TABLET, FILM COATED ORAL at 08:53

## 2017-02-20 RX ADMIN — PANTOPRAZOLE SODIUM 40 MG: 40 TABLET, DELAYED RELEASE ORAL at 08:53

## 2017-02-20 NOTE — PROGRESS NOTES
LOS: 3 days       Chief Complaint :   Shortness of breath     Subjective     Interval History:     Patient Complaints:  Shona Aguilar  recurrence of wheezing, congestion and cough this morning.  She describes shortness of breath is somewhat worse this morning.  He continues to have difficulty with her activities of daily living.  She denies any associated fever.  She notes his cough is nonproductive.  Nurses describe an otherwise uneventful night.         Review of Systems-unchanged since admission history and physical  Objective     Vital Signs  Temp:  [97.5 °F (36.4 °C)-98.3 °F (36.8 °C)] 97.6 °F (36.4 °C)  Heart Rate:  [60-85] 83  Resp:  [18-22] 22  BP: (101-132)/(51-74) 132/70      Physical Exam    Constitutional: She is oriented to person, place, and time.   HENT:   Head: Normocephalic and atraumatic.   Right Ear: External ear normal.   Left Ear: External ear normal.   Eyes: Conjunctivae and EOM are normal. Pupils are equal, round, and reactive to light. Right eye exhibits no discharge. Left eye exhibits no discharge. No scleral icterus.   Neck: Normal range of motion. Neck supple. No JVD present. No tracheal deviation present. No thyromegaly present.   Cardiovascular: Normal rate, regular rhythm, normal heart sounds and intact distal pulses. Exam reveals no gallop and no friction rub.   No murmur heard.  Pulmonary/Chest: No stridor. She has no rales. She exhibits no tenderness.   Respiratory distress with any conversation. Rhonchi and wheezing bilaterally. Diminished breath sounds bilaterally. Increased fremitus bilaterally.   Abdominal: Soft. Bowel sounds are normal. She exhibits no distension and no mass. There is no tenderness. There is no guarding. No hernia.   Genitourinary:   Genitourinary Comments: No Liu catheter   Musculoskeletal: She exhibits no edema, tenderness or deformity.   Lymphadenopathy:   She has no cervical adenopathy.   Neurological: She is alert and oriented to person, place, and  time. She has normal reflexes. She displays normal reflexes. No cranial nerve deficit. She exhibits normal muscle tone. Coordination normal.   Skin: No rash noted. No erythema. No pallor.   Psychiatric: She has a normal mood and affect.   Nursing note and vitals reviewed.      Results Review:     I reviewed the patient's new clinical results  : See Below  MEDICATIONS:    ARIPiprazole 5 mg Oral Daily   aspirin 81 mg Oral Daily   atorvastatin 10 mg Oral Daily   azithromycin 500 mg Intravenous Q24H   budesonide-formoterol 2 puff Inhalation Q12H   ceftriaxone 1 g Intravenous Q24H   digoxin 125 mcg Oral Daily   diltiaZEM 120 mg Oral Daily   donepezil 10 mg Oral Nightly   enoxaparin 30 mg Subcutaneous Daily   furosemide 10 mg Oral Daily   ipratropium-albuterol 3 mL Nebulization Q6H - RT   lithium carbonate 150 mg Oral BID With Meals   LORazepam 0.5 mg Oral 2 times per day   LORazepam 1 mg Oral QAM   megestrol 200 mg Oral Daily   metoprolol tartrate 50 mg Oral Q12H - RT   mirtazapine 30 mg Oral Nightly   pantoprazole 40 mg Oral Daily   predniSONE 10 mg Oral Daily With Breakfast   senna 1 tablet Oral Daily       LABS:    Lab Results   Component Value Date    GLUCOSE 122 (H) 02/19/2017    GLUCOSE 167 (H) 02/18/2017    GLUCOSE 93 02/17/2017    BUN 15 02/19/2017    CREATININE 0.35 (L) 02/19/2017    EGFRIFNONA >150 02/19/2017    EGFRIFAFRI  09/13/2016      Comment:      <15 Indicative of kidney failure.    BCR 42.9 (H) 02/19/2017    CO2 31.7 02/19/2017    CALCIUM 9.6 02/19/2017    ALBUMIN 3.90 02/19/2017    LABIL2 1.5 02/19/2017    AST 24 02/19/2017    ALT 14 02/19/2017    WBC 13.42 (H) 02/20/2017    WBC 17.10 (H) 02/19/2017    WBC 8.05 02/18/2017    HGB 12.1 02/20/2017    HCT 41.0 02/20/2017    .2 (H) 02/20/2017     02/20/2017     02/19/2017     02/18/2017     02/17/2017    K 4.2 02/19/2017    K 4.2 02/18/2017    K 4.3 02/17/2017     02/19/2017    ANIONGAP 4.3 02/19/2017       Lab Results    Component Value Date    INR 0.93 09/13/2016    INR <0.90 12/14/2015    INR 0.91 12/11/2015    PROTIME 10.5 09/13/2016    PROTIME 10.0 12/14/2015    PROTIME 10.3 12/11/2015             Assessment/Plan    Active Problems:      1) Pneumonia-left lower lobe-blood cultures obtained and are pending.  2) COPD exacerbation  3) acute on chronic respiratory failure with decreased saturations and respiratory distress from baseline.  4) early sepsis  5) lactic acidosis  6) history of paroxysmal atrial fibrillation  7) history of bipolar disorder  8) l chronic limited mobility secondary to respiratory distress  9) leukocytosis  10) DVT prophylaxis-subcutaneous Lovenox  11) stress gastritis prophylaxis-on Protonix orally.   See orders entered.     Mohamud Acosta MD  02/20/17  6:44 AM

## 2017-02-20 NOTE — PROGRESS NOTES
Discharge Planning Assessment  [facility]     Patient Name: Shona Aguilar  MRN: [id number]  Today's Date: 2/20/2017    Admit Date: 2/17/2017       Discharge Plan       02/20/17 1146    Case Management/Social Work Plan    Plan Pt was admitted from [facility] and had a 14 day skilled bed hold upon admission, therefore she has 11 days of bed hold remaining. SS to follow.         Discharge Placement     Facility/Agency Request Status Selected? Address Phone Number Fax Number    [facility] - [facility] Request Sent     [address] [facility] [address] [phone]                    Ciara Forbes

## 2017-02-20 NOTE — PLAN OF CARE
Problem: Pain, Acute (Adult)  Goal: Identify Related Risk Factors and Signs and Symptoms  Outcome: Ongoing (interventions implemented as appropriate)    02/20/17 0248   Pain, Acute   Related Risk Factors (Acute Pain) positioning  (hospital bede)   Signs and Symptoms (Acute Pain) facial mask of pain/grimace;verbalization of pain descriptors       Goal: Acceptable Pain Control/Comfort Level  Outcome: Ongoing (interventions implemented as appropriate)    02/20/17 0248   Pain, Acute (Adult)   Acceptable Pain Control/Comfort Level making progress toward outcome

## 2017-02-20 NOTE — PLAN OF CARE
Problem: Fluid Volume Deficit (Adult)  Goal: Identify Related Risk Factors and Signs and Symptoms  Outcome: Ongoing (interventions implemented as appropriate)    02/18/17 0244   Fluid Volume Deficit   Fluid Volume Deficit: Related Risk Factors age extremes;functional decline;infection/sepsis   Signs and Symptoms (Fluid Volume Deficit) muscle weakness;skin turgor decreased       Goal: Fluid/Electrolyte Balance  Outcome: Ongoing (interventions implemented as appropriate)    02/20/17 0247   Fluid Volume Deficit (Adult)   Fluid/Electrolyte Balance making progress toward outcome       Goal: Comfort/Well Being  Outcome: Ongoing (interventions implemented as appropriate)    02/20/17 0247   Fluid Volume Deficit (Adult)   Comfort/Well Being making progress toward outcome         Problem: Fall Risk (Adult)  Goal: Identify Related Risk Factors and Signs and Symptoms  Outcome: Ongoing (interventions implemented as appropriate)    02/17/17 2007   Fall Risk   Fall Risk: Related Risk Factors age-related changes;fatigue/slow reaction   Fall Risk: Signs and Symptoms presence of risk factors       Goal: Absence of Falls  Outcome: Ongoing (interventions implemented as appropriate)    02/20/17 0247   Fall Risk (Adult)   Absence of Falls making progress toward outcome         Problem: Respiratory Insufficiency (Adult)  Goal: Identify Related Risk Factors and Signs and Symptoms  Outcome: Ongoing (interventions implemented as appropriate)    02/17/17 2007   Respiratory Insufficiency   Related Risk Factors (Respiratory Insufficiency) activity intolerance   Signs and Symptoms (Respiratory Insufficiency) shortness of breath;sleeplessness/fatigue       Goal: Acid/Base Balance  Outcome: Ongoing (interventions implemented as appropriate)    02/20/17 0247   Respiratory Insufficiency (Adult)   Acid/Base Balance making progress toward outcome       Goal: Effective Ventilation  Outcome: Ongoing (interventions implemented as appropriate)    02/20/17  0247   Respiratory Insufficiency (Adult)   Effective Ventilation making progress toward outcome         Problem: Patient Care Overview (Adult)  Goal: Plan of Care Review  Outcome: Ongoing (interventions implemented as appropriate)    02/20/17 0247   Coping/Psychosocial Response Interventions   Plan Of Care Reviewed With patient   Patient Care Overview   Progress progress toward functional goals as expected       Goal: Adult Individualization and Mutuality  Outcome: Ongoing (interventions implemented as appropriate)

## 2017-02-21 ENCOUNTER — APPOINTMENT (OUTPATIENT)
Dept: GENERAL RADIOLOGY | Facility: HOSPITAL | Age: 72
End: 2017-02-21

## 2017-02-21 LAB
ALBUMIN SERPL-MCNC: 3.2 G/DL (ref 3.4–4.8)
ALBUMIN/GLOB SERPL: 1.4 G/DL (ref 1.5–2.5)
ALP SERPL-CCNC: 38 U/L (ref 35–104)
ALT SERPL W P-5'-P-CCNC: 17 U/L (ref 10–36)
ANION GAP SERPL CALCULATED.3IONS-SCNC: 0.1 MMOL/L (ref 3.6–11.2)
AST SERPL-CCNC: 18 U/L (ref 10–30)
BASOPHILS # BLD AUTO: 0.02 10*3/MM3 (ref 0–0.3)
BASOPHILS NFR BLD AUTO: 0.2 % (ref 0–2)
BILIRUB SERPL-MCNC: 0.2 MG/DL (ref 0.2–1.8)
BUN BLD-MCNC: 18 MG/DL (ref 7–21)
BUN/CREAT SERPL: 45 (ref 7–25)
CALCIUM SPEC-SCNC: 8.5 MG/DL (ref 7.7–10)
CHLORIDE SERPL-SCNC: 109 MMOL/L (ref 99–112)
CO2 SERPL-SCNC: 33.9 MMOL/L (ref 24.3–31.9)
CREAT BLD-MCNC: 0.4 MG/DL (ref 0.43–1.29)
CRP SERPL-MCNC: 0.63 MG/DL (ref 0–0.99)
DEPRECATED RDW RBC AUTO: 47.3 FL (ref 37–54)
EOSINOPHIL # BLD AUTO: 0.02 10*3/MM3 (ref 0–0.7)
EOSINOPHIL NFR BLD AUTO: 0.2 % (ref 0–7)
ERYTHROCYTE [DISTWIDTH] IN BLOOD BY AUTOMATED COUNT: 13.3 % (ref 11.5–14.5)
GFR SERPL CREATININE-BSD FRML MDRD: >150 ML/MIN/1.73
GLOBULIN UR ELPH-MCNC: 2.3 GM/DL
GLUCOSE BLD-MCNC: 102 MG/DL (ref 70–110)
HCT VFR BLD AUTO: 36.5 % (ref 37–47)
HGB BLD-MCNC: 10.7 G/DL (ref 12–16)
IMM GRANULOCYTES # BLD: 0.05 10*3/MM3 (ref 0–0.03)
IMM GRANULOCYTES NFR BLD: 0.5 % (ref 0–0.5)
LYMPHOCYTES # BLD AUTO: 1.49 10*3/MM3 (ref 1–3)
LYMPHOCYTES NFR BLD AUTO: 13.5 % (ref 16–46)
MCH RBC QN AUTO: 29.6 PG (ref 27–33)
MCHC RBC AUTO-ENTMCNC: 29.3 G/DL (ref 33–37)
MCV RBC AUTO: 100.8 FL (ref 80–94)
MONOCYTES # BLD AUTO: 0.87 10*3/MM3 (ref 0.1–0.9)
MONOCYTES NFR BLD AUTO: 7.9 % (ref 0–12)
NEUTROPHILS # BLD AUTO: 8.56 10*3/MM3 (ref 1.4–6.5)
NEUTROPHILS NFR BLD AUTO: 77.7 % (ref 40–75)
OSMOLALITY SERPL CALC.SUM OF ELEC: 287.1 MOSM/KG (ref 273–305)
PLATELET # BLD AUTO: 249 10*3/MM3 (ref 130–400)
PMV BLD AUTO: 10.1 FL (ref 6–10)
POTASSIUM BLD-SCNC: 4.3 MMOL/L (ref 3.5–5.3)
PROT SERPL-MCNC: 5.5 G/DL (ref 6–8)
RBC # BLD AUTO: 3.62 10*6/MM3 (ref 4.2–5.4)
SODIUM BLD-SCNC: 143 MMOL/L (ref 135–153)
WBC NRBC COR # BLD: 11.01 10*3/MM3 (ref 4.5–12.5)

## 2017-02-21 PROCEDURE — 63710000001 PREDNISONE PER 5 MG: Performed by: INTERNAL MEDICINE

## 2017-02-21 PROCEDURE — 80053 COMPREHEN METABOLIC PANEL: CPT | Performed by: INTERNAL MEDICINE

## 2017-02-21 PROCEDURE — 85025 COMPLETE CBC W/AUTO DIFF WBC: CPT | Performed by: INTERNAL MEDICINE

## 2017-02-21 PROCEDURE — 25010000002 FUROSEMIDE PER 20 MG: Performed by: INTERNAL MEDICINE

## 2017-02-21 PROCEDURE — 94799 UNLISTED PULMONARY SVC/PX: CPT

## 2017-02-21 PROCEDURE — 71010 HC CHEST PA OR AP: CPT

## 2017-02-21 PROCEDURE — 71010 XR CHEST 1 VW: CPT | Performed by: RADIOLOGY

## 2017-02-21 PROCEDURE — 86140 C-REACTIVE PROTEIN: CPT | Performed by: INTERNAL MEDICINE

## 2017-02-21 PROCEDURE — 25010000002 ENOXAPARIN PER 10 MG: Performed by: INTERNAL MEDICINE

## 2017-02-21 RX ORDER — FUROSEMIDE 10 MG/ML
20 INJECTION INTRAMUSCULAR; INTRAVENOUS ONCE
Status: COMPLETED | OUTPATIENT
Start: 2017-02-21 | End: 2017-02-21

## 2017-02-21 RX ADMIN — IPRATROPIUM BROMIDE AND ALBUTEROL SULFATE 3 ML: .5; 3 SOLUTION RESPIRATORY (INHALATION) at 00:18

## 2017-02-21 RX ADMIN — ATORVASTATIN CALCIUM 10 MG: 10 TABLET, FILM COATED ORAL at 09:50

## 2017-02-21 RX ADMIN — IPRATROPIUM BROMIDE AND ALBUTEROL SULFATE 3 ML: .5; 3 SOLUTION RESPIRATORY (INHALATION) at 12:38

## 2017-02-21 RX ADMIN — ENOXAPARIN SODIUM 30 MG: 30 INJECTION SUBCUTANEOUS at 09:50

## 2017-02-21 RX ADMIN — HYDROCODONE BITARTRATE AND ACETAMINOPHEN 1 TABLET: 5; 325 TABLET ORAL at 12:32

## 2017-02-21 RX ADMIN — LITHIUM CARBONATE 150 MG: 150 CAPSULE, GELATIN COATED ORAL at 09:50

## 2017-02-21 RX ADMIN — FUROSEMIDE 10 MG: 20 TABLET ORAL at 09:50

## 2017-02-21 RX ADMIN — SENNOSIDES 1 TABLET: 8.6 TABLET ORAL at 09:52

## 2017-02-21 RX ADMIN — ASPIRIN 81 MG: 81 TABLET, CHEWABLE ORAL at 09:50

## 2017-02-21 RX ADMIN — BUDESONIDE 0.5 MG: 0.5 INHALANT RESPIRATORY (INHALATION) at 19:23

## 2017-02-21 RX ADMIN — LORAZEPAM 0.5 MG: 0.5 TABLET ORAL at 12:32

## 2017-02-21 RX ADMIN — ARIPIPRAZOLE 5 MG: 10 TABLET ORAL at 09:49

## 2017-02-21 RX ADMIN — CEFPODOXIME PROXETIL 200 MG: 200 TABLET, FILM COATED ORAL at 09:50

## 2017-02-21 RX ADMIN — LITHIUM CARBONATE 150 MG: 150 CAPSULE, GELATIN COATED ORAL at 18:01

## 2017-02-21 RX ADMIN — DONEPEZIL HYDROCHLORIDE 10 MG: 5 TABLET, FILM COATED ORAL at 20:29

## 2017-02-21 RX ADMIN — METOPROLOL TARTRATE 50 MG: 50 TABLET, FILM COATED ORAL at 09:51

## 2017-02-21 RX ADMIN — PANTOPRAZOLE SODIUM 40 MG: 40 TABLET, DELAYED RELEASE ORAL at 09:50

## 2017-02-21 RX ADMIN — METOPROLOL TARTRATE 50 MG: 50 TABLET, FILM COATED ORAL at 20:29

## 2017-02-21 RX ADMIN — LORAZEPAM 0.5 MG: 0.5 TABLET ORAL at 20:20

## 2017-02-21 RX ADMIN — BUDESONIDE 0.5 MG: 0.5 INHALANT RESPIRATORY (INHALATION) at 07:12

## 2017-02-21 RX ADMIN — IPRATROPIUM BROMIDE AND ALBUTEROL SULFATE 3 ML: .5; 3 SOLUTION RESPIRATORY (INHALATION) at 19:21

## 2017-02-21 RX ADMIN — HYDROCODONE BITARTRATE AND ACETAMINOPHEN 1 TABLET: 5; 325 TABLET ORAL at 18:00

## 2017-02-21 RX ADMIN — FUROSEMIDE 20 MG: 10 INJECTION, SOLUTION INTRAMUSCULAR; INTRAVENOUS at 10:32

## 2017-02-21 RX ADMIN — IPRATROPIUM BROMIDE AND ALBUTEROL SULFATE 3 ML: .5; 3 SOLUTION RESPIRATORY (INHALATION) at 07:11

## 2017-02-21 RX ADMIN — DIGOXIN 125 MCG: 125 TABLET ORAL at 12:31

## 2017-02-21 RX ADMIN — CEFPODOXIME PROXETIL 200 MG: 200 TABLET, FILM COATED ORAL at 20:29

## 2017-02-21 RX ADMIN — TRAMADOL HYDROCHLORIDE 50 MG: 50 TABLET, FILM COATED ORAL at 20:45

## 2017-02-21 RX ADMIN — DILTIAZEM HYDROCHLORIDE 120 MG: 60 TABLET, FILM COATED ORAL at 09:50

## 2017-02-21 RX ADMIN — MIRTAZAPINE 30 MG: 15 TABLET, FILM COATED ORAL at 20:29

## 2017-02-21 RX ADMIN — PREDNISONE 60 MG: 20 TABLET ORAL at 09:51

## 2017-02-21 RX ADMIN — MEGESTROL ACETATE 200 MG: 40 SUSPENSION ORAL at 09:52

## 2017-02-21 RX ADMIN — HYDROCODONE BITARTRATE AND ACETAMINOPHEN 1 TABLET: 5; 325 TABLET ORAL at 05:39

## 2017-02-21 RX ADMIN — LORAZEPAM 1 MG: 1 TABLET ORAL at 06:28

## 2017-02-21 NOTE — PLAN OF CARE
Problem: Fluid Volume Deficit (Adult)  Goal: Identify Related Risk Factors and Signs and Symptoms  Outcome: Ongoing (interventions implemented as appropriate)    02/20/17 1620   Fluid Volume Deficit   Fluid Volume Deficit: Related Risk Factors age extremes;functional decline;infection/sepsis   Signs and Symptoms (Fluid Volume Deficit) muscle weakness;skin turgor decreased         Problem: Fall Risk (Adult)  Goal: Identify Related Risk Factors and Signs and Symptoms  Outcome: Ongoing (interventions implemented as appropriate)    02/20/17 1620   Fall Risk   Fall Risk: Related Risk Factors age-related changes;fatigue/slow reaction;gait/mobility problems   Fall Risk: Signs and Symptoms presence of risk factors         Problem: Patient Care Overview (Adult)  Goal: Plan of Care Review  Outcome: Ongoing (interventions implemented as appropriate)    02/20/17 1620 02/20/17 2015   Coping/Psychosocial Response Interventions   Plan Of Care Reviewed With --  patient   Patient Care Overview   Progress progress toward functional goals as expected --          Problem: Pain, Acute (Adult)  Goal: Acceptable Pain Control/Comfort Level  Outcome: Ongoing (interventions implemented as appropriate)    02/21/17 0030   Pain, Acute (Adult)   Acceptable Pain Control/Comfort Level making progress toward outcome

## 2017-02-21 NOTE — PROGRESS NOTES
Antimicrobial Length of Therapy:    Day 2 Vantin  (day 5 total antibiotic therapy)    Thank you.  Mary Mcintosh, Pharm.D.  2/21/2017  1:59 PM

## 2017-02-21 NOTE — PLAN OF CARE
Problem: Fluid Volume Deficit (Adult)  Goal: Comfort/Well Being  Outcome: Ongoing (interventions implemented as appropriate)    Problem: Fall Risk (Adult)  Goal: Identify Related Risk Factors and Signs and Symptoms  Outcome: Ongoing (interventions implemented as appropriate)  Goal: Absence of Falls  Outcome: Ongoing (interventions implemented as appropriate)    Problem: Respiratory Insufficiency (Adult)  Goal: Acid/Base Balance  Outcome: Ongoing (interventions implemented as appropriate)    Problem: Patient Care Overview (Adult)  Goal: Plan of Care Review  Outcome: Ongoing (interventions implemented as appropriate)    Problem: Pain, Acute (Adult)  Goal: Identify Related Risk Factors and Signs and Symptoms  Outcome: Ongoing (interventions implemented as appropriate)  Goal: Acceptable Pain Control/Comfort Level  Outcome: Ongoing (interventions implemented as appropriate)

## 2017-02-21 NOTE — PROGRESS NOTES
LOS: 4 days       Chief Complaint :   Shortness of breath     Subjective     Interval History:     Patient Complaints:  Shona Aguilar  and to use with shortness of breath, cough and congestion.  She also continues with severe weakness and debility.  She continues to struggle to perform activities of daily living.  She is currently on oxygen 5 L/m per nasal cannula and saturations are 92%.  She describes her symptoms are her certainly worse with any exertion or speech.  Otherwise she denies any chest pain, Palpitations nausea vomiting.   Nurses describe an otherwise uneventful night.         Review of Systems-unchanged since admission history and physical  Objective     Vital Signs  Temp:  [97.4 °F (36.3 °C)-98.7 °F (37.1 °C)] 97.4 °F (36.3 °C)  Heart Rate:  [58-84] 84  Resp:  [18-24] 20  BP: (102-161)/(64-85) 144/84      Physical Exam    Constitutional: She is oriented to person, place, and time.   HENT:   Head: Normocephalic and atraumatic.   Right Ear: External ear normal.   Left Ear: External ear normal.   Eyes: Conjunctivae and EOM are normal. Pupils are equal, round, and reactive to light. Right eye exhibits no discharge. Left eye exhibits no discharge. No scleral icterus.   Neck: Normal range of motion. Neck supple. No JVD present. No tracheal deviation present. No thyromegaly present.   Cardiovascular: Normal rate, regular rhythm, normal heart sounds and intact distal pulses. Exam reveals no gallop and no friction rub.   No murmur heard.  Pulmonary/Chest: No stridor. She has no rales. She exhibits no tenderness.   Respiratory distress with any conversation. Rhonchi and wheezing bilaterally. Diminished breath sounds bilaterally. Increased fremitus bilaterally.   Abdominal: Soft. Bowel sounds are normal. She exhibits no distension and no mass. There is no tenderness. There is no guarding. No hernia.   Genitourinary:   Genitourinary Comments: No Liu catheter   Musculoskeletal: She exhibits no edema,  tenderness or deformity.   Lymphadenopathy:   She has no cervical adenopathy.   Neurological: She is alert and oriented to person, place, and time. She has normal reflexes. She displays normal reflexes. No cranial nerve deficit. She exhibits normal muscle tone. Coordination normal.   Skin: No rash noted. No erythema. No pallor.   Psychiatric: She has a normal mood and affect.   Nursing note and vitals reviewed.      Results Review:     I reviewed the patient's new clinical results  : See Below  MEDICATIONS:    ARIPiprazole 5 mg Oral Daily   aspirin 81 mg Oral Daily   atorvastatin 10 mg Oral Daily   budesonide 0.5 mg Nebulization BID - RT   cefpodoxime 200 mg Oral Q12H   digoxin 125 mcg Oral Daily   diltiaZEM 120 mg Oral Daily   donepezil 10 mg Oral Nightly   enoxaparin 30 mg Subcutaneous Daily   furosemide 10 mg Oral Daily   ipratropium-albuterol 3 mL Nebulization Q6H - RT   lithium carbonate 150 mg Oral BID With Meals   LORazepam 0.5 mg Oral 2 times per day   LORazepam 1 mg Oral QAM   megestrol 200 mg Oral Daily   metoprolol tartrate 50 mg Oral Q12H - RT   mirtazapine 30 mg Oral Nightly   pantoprazole 40 mg Oral Daily   predniSONE 60 mg Oral Daily With Breakfast   senna 1 tablet Oral Daily       LABS:    Lab Results   Component Value Date    GLUCOSE 102 02/21/2017    GLUCOSE 122 (H) 02/19/2017    GLUCOSE 167 (H) 02/18/2017    BUN 18 02/21/2017    CREATININE 0.40 (L) 02/21/2017    EGFRIFNONA >150 02/21/2017    EGFRIFAFRI  09/13/2016      Comment:      <15 Indicative of kidney failure.    BCR 45.0 (H) 02/21/2017    CO2 33.9 (H) 02/21/2017    CALCIUM 8.5 02/21/2017    ALBUMIN 3.20 (L) 02/21/2017    LABIL2 1.4 (L) 02/21/2017    AST 18 02/21/2017    ALT 17 02/21/2017    WBC 11.01 02/21/2017    WBC 13.42 (H) 02/20/2017    WBC 17.10 (H) 02/19/2017    HGB 10.7 (L) 02/21/2017    HCT 36.5 (L) 02/21/2017    .8 (H) 02/21/2017     02/21/2017     02/21/2017     02/19/2017     02/18/2017    K 4.3  02/21/2017    K 4.2 02/19/2017    K 4.2 02/18/2017     02/21/2017    ANIONGAP 0.1 (L) 02/21/2017       Lab Results   Component Value Date    INR 0.93 09/13/2016    INR <0.90 12/14/2015    INR 0.91 12/11/2015    PROTIME 10.5 09/13/2016    PROTIME 10.0 12/14/2015    PROTIME 10.3 12/11/2015             Assessment/Plan    Active Problems:      1) Pneumonia-left lower lobe-blood cultures obtained and are pending.  2) COPD exacerbation  3) acute on chronic respiratory failure with decreased saturations and respiratory distress from baseline.  4) early sepsis  5) lactic acidosis  6) history of paroxysmal atrial fibrillation  7) history of bipolar disorder  8) l chronic limited mobility secondary to respiratory distress  9) leukocytosis  10) DVT prophylaxis-subcutaneous Lovenox  11) stress gastritis prophylaxis-on Protonix orally.   See orders entered.     Mohamud Acosta MD  02/21/17  6:41 AM

## 2017-02-22 ENCOUNTER — HOSPITAL ENCOUNTER (OUTPATIENT)
Facility: HOSPITAL | Age: 72
End: 2017-03-16
Attending: INTERNAL MEDICINE | Admitting: INTERNAL MEDICINE

## 2017-02-22 VITALS
HEIGHT: 62 IN | BODY MASS INDEX: 19.33 KG/M2 | HEART RATE: 74 BPM | TEMPERATURE: 98 F | SYSTOLIC BLOOD PRESSURE: 142 MMHG | OXYGEN SATURATION: 92 % | WEIGHT: 105.06 LBS | DIASTOLIC BLOOD PRESSURE: 74 MMHG | RESPIRATION RATE: 24 BRPM

## 2017-02-22 LAB
ALBUMIN SERPL-MCNC: 3.7 G/DL (ref 3.4–4.8)
ALBUMIN SERPL-MCNC: 3.7 G/DL (ref 3.4–4.8)
ALBUMIN SERPL-MCNC: 3.9 G/DL (ref 3.4–4.8)
ALBUMIN/GLOB SERPL: 1.4 G/DL (ref 1.5–2.5)
ALBUMIN/GLOB SERPL: 1.6 G/DL (ref 1.5–2.5)
ALP SERPL-CCNC: 39 U/L (ref 35–104)
ALP SERPL-CCNC: 52 U/L (ref 35–104)
ALT SERPL W P-5'-P-CCNC: 13 U/L (ref 10–36)
ALT SERPL W P-5'-P-CCNC: 15 U/L (ref 10–36)
ANION GAP SERPL CALCULATED.3IONS-SCNC: 5.9 MMOL/L (ref 3.6–11.2)
ANION GAP SERPL CALCULATED.3IONS-SCNC: 7.6 MMOL/L (ref 3.6–11.2)
APTT PPP: <23 SECONDS (ref 24.4–31)
AST SERPL-CCNC: 14 U/L (ref 10–30)
AST SERPL-CCNC: 17 U/L (ref 10–30)
BACTERIA SPEC AEROBE CULT: NORMAL
BACTERIA SPEC AEROBE CULT: NORMAL
BASOPHILS # BLD AUTO: 0.01 10*3/MM3 (ref 0–0.3)
BASOPHILS # BLD AUTO: 0.02 10*3/MM3 (ref 0–0.3)
BASOPHILS NFR BLD AUTO: 0.1 % (ref 0–2)
BASOPHILS NFR BLD AUTO: 0.1 % (ref 0–2)
BILIRUB SERPL-MCNC: 0.3 MG/DL (ref 0.2–1.8)
BILIRUB SERPL-MCNC: 0.3 MG/DL (ref 0.2–1.8)
BNP SERPL-MCNC: 287 PG/ML (ref 0–100)
BNP SERPL-MCNC: 348 PG/ML (ref 0–100)
BUN BLD-MCNC: 20 MG/DL (ref 7–21)
BUN BLD-MCNC: 22 MG/DL (ref 7–21)
BUN/CREAT SERPL: 50 (ref 7–25)
BUN/CREAT SERPL: 56.4 (ref 7–25)
CALCIUM SPEC-SCNC: 8.8 MG/DL (ref 7.7–10)
CALCIUM SPEC-SCNC: 9.2 MG/DL (ref 7.7–10)
CHLORIDE SERPL-SCNC: 103 MMOL/L (ref 99–112)
CHLORIDE SERPL-SCNC: 104 MMOL/L (ref 99–112)
CO2 SERPL-SCNC: 35.4 MMOL/L (ref 24.3–31.9)
CO2 SERPL-SCNC: 36.1 MMOL/L (ref 24.3–31.9)
CREAT BLD-MCNC: 0.39 MG/DL (ref 0.43–1.29)
CREAT BLD-MCNC: 0.4 MG/DL (ref 0.43–1.29)
CRP SERPL-MCNC: <0.5 MG/DL (ref 0–0.99)
CRP SERPL-MCNC: <0.5 MG/DL (ref 0–0.99)
DEPRECATED RDW RBC AUTO: 47 FL (ref 37–54)
DEPRECATED RDW RBC AUTO: 47.3 FL (ref 37–54)
DIGOXIN SERPL-MCNC: 0.9 NG/ML (ref 0.8–2)
EOSINOPHIL # BLD AUTO: 0.01 10*3/MM3 (ref 0–0.7)
EOSINOPHIL # BLD AUTO: 0.27 10*3/MM3 (ref 0–0.7)
EOSINOPHIL NFR BLD AUTO: 0.1 % (ref 0–7)
EOSINOPHIL NFR BLD AUTO: 1.8 % (ref 0–7)
ERYTHROCYTE [DISTWIDTH] IN BLOOD BY AUTOMATED COUNT: 13.2 % (ref 11.5–14.5)
ERYTHROCYTE [DISTWIDTH] IN BLOOD BY AUTOMATED COUNT: 13.2 % (ref 11.5–14.5)
GFR SERPL CREATININE-BSD FRML MDRD: >150 ML/MIN/1.73
GFR SERPL CREATININE-BSD FRML MDRD: >150 ML/MIN/1.73
GLOBULIN UR ELPH-MCNC: 2.3 GM/DL
GLOBULIN UR ELPH-MCNC: 2.8 GM/DL
GLUCOSE BLD-MCNC: 130 MG/DL (ref 70–110)
GLUCOSE BLD-MCNC: 99 MG/DL (ref 70–110)
HCT VFR BLD AUTO: 39.8 % (ref 37–47)
HCT VFR BLD AUTO: 41.5 % (ref 37–47)
HGB BLD-MCNC: 11.9 G/DL (ref 12–16)
HGB BLD-MCNC: 12.4 G/DL (ref 12–16)
IMM GRANULOCYTES # BLD: 0.05 10*3/MM3 (ref 0–0.03)
IMM GRANULOCYTES # BLD: 0.16 10*3/MM3 (ref 0–0.03)
IMM GRANULOCYTES NFR BLD: 0.4 % (ref 0–0.5)
IMM GRANULOCYTES NFR BLD: 1 % (ref 0–0.5)
INR PPP: 0.95 (ref 0.8–1.1)
LYMPHOCYTES # BLD AUTO: 1.12 10*3/MM3 (ref 1–3)
LYMPHOCYTES # BLD AUTO: 3.17 10*3/MM3 (ref 1–3)
LYMPHOCYTES NFR BLD AUTO: 20.6 % (ref 16–46)
LYMPHOCYTES NFR BLD AUTO: 9.5 % (ref 16–46)
MAGNESIUM SERPL-MCNC: 2.1 MG/DL (ref 1.7–2.6)
MCH RBC QN AUTO: 29.9 PG (ref 27–33)
MCH RBC QN AUTO: 30.1 PG (ref 27–33)
MCHC RBC AUTO-ENTMCNC: 29.9 G/DL (ref 33–37)
MCHC RBC AUTO-ENTMCNC: 29.9 G/DL (ref 33–37)
MCV RBC AUTO: 100 FL (ref 80–94)
MCV RBC AUTO: 100.8 FL (ref 80–94)
MONOCYTES # BLD AUTO: 0.83 10*3/MM3 (ref 0.1–0.9)
MONOCYTES # BLD AUTO: 1.1 10*3/MM3 (ref 0.1–0.9)
MONOCYTES NFR BLD AUTO: 7 % (ref 0–12)
MONOCYTES NFR BLD AUTO: 7.1 % (ref 0–12)
NEUTROPHILS # BLD AUTO: 10.7 10*3/MM3 (ref 1.4–6.5)
NEUTROPHILS # BLD AUTO: 9.8 10*3/MM3 (ref 1.4–6.5)
NEUTROPHILS NFR BLD AUTO: 69.4 % (ref 40–75)
NEUTROPHILS NFR BLD AUTO: 82.9 % (ref 40–75)
OSMOLALITY SERPL CALC.SUM OF ELEC: 293.2 MOSM/KG (ref 273–305)
OSMOLALITY SERPL CALC.SUM OF ELEC: 295.6 MOSM/KG (ref 273–305)
PHOSPHATE SERPL-MCNC: 2.8 MG/DL (ref 2.7–4.5)
PLATELET # BLD AUTO: 266 10*3/MM3 (ref 130–400)
PLATELET # BLD AUTO: 289 10*3/MM3 (ref 130–400)
PMV BLD AUTO: 10 FL (ref 6–10)
PMV BLD AUTO: 10 FL (ref 6–10)
POTASSIUM BLD-SCNC: 3.5 MMOL/L (ref 3.5–5.3)
POTASSIUM BLD-SCNC: 3.7 MMOL/L (ref 3.5–5.3)
PROT SERPL-MCNC: 6 G/DL (ref 6–8)
PROT SERPL-MCNC: 6.7 G/DL (ref 6–8)
PROTHROMBIN TIME: 10.7 SECONDS (ref 9.8–11.9)
RBC # BLD AUTO: 3.95 10*6/MM3 (ref 4.2–5.4)
RBC # BLD AUTO: 4.15 10*6/MM3 (ref 4.2–5.4)
SODIUM BLD-SCNC: 146 MMOL/L (ref 135–153)
SODIUM BLD-SCNC: 146 MMOL/L (ref 135–153)
WBC NRBC COR # BLD: 11.82 10*3/MM3 (ref 4.5–12.5)
WBC NRBC COR # BLD: 15.42 10*3/MM3 (ref 4.5–12.5)

## 2017-02-22 PROCEDURE — 80053 COMPREHEN METABOLIC PANEL: CPT | Performed by: INTERNAL MEDICINE

## 2017-02-22 PROCEDURE — 83735 ASSAY OF MAGNESIUM: CPT | Performed by: INTERNAL MEDICINE

## 2017-02-22 PROCEDURE — 85025 COMPLETE CBC W/AUTO DIFF WBC: CPT | Performed by: INTERNAL MEDICINE

## 2017-02-22 PROCEDURE — 86140 C-REACTIVE PROTEIN: CPT | Performed by: INTERNAL MEDICINE

## 2017-02-22 PROCEDURE — 83880 ASSAY OF NATRIURETIC PEPTIDE: CPT | Performed by: INTERNAL MEDICINE

## 2017-02-22 PROCEDURE — 25010000002 VANCOMYCIN PER 500 MG

## 2017-02-22 PROCEDURE — 85730 THROMBOPLASTIN TIME PARTIAL: CPT | Performed by: INTERNAL MEDICINE

## 2017-02-22 PROCEDURE — 25010000002 CEFEPIME: Performed by: INTERNAL MEDICINE

## 2017-02-22 PROCEDURE — 85610 PROTHROMBIN TIME: CPT | Performed by: INTERNAL MEDICINE

## 2017-02-22 PROCEDURE — 80162 ASSAY OF DIGOXIN TOTAL: CPT | Performed by: INTERNAL MEDICINE

## 2017-02-22 PROCEDURE — 94799 UNLISTED PULMONARY SVC/PX: CPT

## 2017-02-22 PROCEDURE — 25010000002 MEROPENEM: Performed by: INTERNAL MEDICINE

## 2017-02-22 PROCEDURE — 25010000002 ENOXAPARIN PER 10 MG: Performed by: INTERNAL MEDICINE

## 2017-02-22 PROCEDURE — 82040 ASSAY OF SERUM ALBUMIN: CPT | Performed by: INTERNAL MEDICINE

## 2017-02-22 PROCEDURE — 84100 ASSAY OF PHOSPHORUS: CPT | Performed by: INTERNAL MEDICINE

## 2017-02-22 RX ORDER — BUDESONIDE 0.5 MG/2ML
0.5 INHALANT ORAL
Qty: 120 EACH | Refills: 0
Start: 2017-02-22

## 2017-02-22 RX ORDER — LIDOCAINE HYDROCHLORIDE 10 MG/ML
10 INJECTION, SOLUTION INFILTRATION; PERINEURAL ONCE
Status: DISCONTINUED | OUTPATIENT
Start: 2017-02-22 | End: 2017-02-22 | Stop reason: HOSPADM

## 2017-02-22 RX ORDER — IPRATROPIUM BROMIDE AND ALBUTEROL SULFATE 2.5; .5 MG/3ML; MG/3ML
3 SOLUTION RESPIRATORY (INHALATION)
Qty: 360 ML | Refills: 3
Start: 2017-02-22

## 2017-02-22 RX ADMIN — HYDROCODONE BITARTRATE AND ACETAMINOPHEN 1 TABLET: 5; 325 TABLET ORAL at 00:53

## 2017-02-22 RX ADMIN — CEFEPIME 2 G: 2 INJECTION, POWDER, FOR SOLUTION INTRAVENOUS at 17:33

## 2017-02-22 RX ADMIN — HYDROCODONE BITARTRATE AND ACETAMINOPHEN 1 TABLET: 5; 325 TABLET ORAL at 12:56

## 2017-02-22 RX ADMIN — DIGOXIN 125 MCG: 125 TABLET ORAL at 11:49

## 2017-02-22 RX ADMIN — METRONIDAZOLE 500 MG: 500 INJECTION, SOLUTION INTRAVENOUS at 15:16

## 2017-02-22 RX ADMIN — MEROPENEM 1 G: 1 INJECTION, POWDER, FOR SOLUTION INTRAVENOUS at 10:00

## 2017-02-22 RX ADMIN — IPRATROPIUM BROMIDE AND ALBUTEROL SULFATE 3 ML: .5; 3 SOLUTION RESPIRATORY (INHALATION) at 01:12

## 2017-02-22 RX ADMIN — HYDROCODONE BITARTRATE AND ACETAMINOPHEN 1 TABLET: 5; 325 TABLET ORAL at 18:55

## 2017-02-22 RX ADMIN — PANTOPRAZOLE SODIUM 40 MG: 40 TABLET, DELAYED RELEASE ORAL at 08:17

## 2017-02-22 RX ADMIN — METOPROLOL TARTRATE 50 MG: 50 TABLET, FILM COATED ORAL at 08:16

## 2017-02-22 RX ADMIN — IPRATROPIUM BROMIDE AND ALBUTEROL SULFATE 3 ML: .5; 3 SOLUTION RESPIRATORY (INHALATION) at 06:57

## 2017-02-22 RX ADMIN — ARIPIPRAZOLE 5 MG: 10 TABLET ORAL at 08:16

## 2017-02-22 RX ADMIN — DILTIAZEM HYDROCHLORIDE 120 MG: 60 TABLET, FILM COATED ORAL at 08:17

## 2017-02-22 RX ADMIN — BUDESONIDE 0.5 MG: 0.5 INHALANT RESPIRATORY (INHALATION) at 07:09

## 2017-02-22 RX ADMIN — LITHIUM CARBONATE 150 MG: 150 CAPSULE, GELATIN COATED ORAL at 08:17

## 2017-02-22 RX ADMIN — MEGESTROL ACETATE 200 MG: 40 SUSPENSION ORAL at 08:17

## 2017-02-22 RX ADMIN — ASPIRIN 81 MG: 81 TABLET, CHEWABLE ORAL at 08:17

## 2017-02-22 RX ADMIN — ATORVASTATIN CALCIUM 10 MG: 10 TABLET, FILM COATED ORAL at 08:16

## 2017-02-22 RX ADMIN — LORAZEPAM 0.5 MG: 0.5 TABLET ORAL at 11:49

## 2017-02-22 RX ADMIN — LORAZEPAM 1 MG: 1 TABLET ORAL at 06:43

## 2017-02-22 RX ADMIN — VANCOMYCIN HYDROCHLORIDE 1000 MG: 5 INJECTION, POWDER, LYOPHILIZED, FOR SOLUTION INTRAVENOUS at 16:26

## 2017-02-22 RX ADMIN — ENOXAPARIN SODIUM 30 MG: 30 INJECTION SUBCUTANEOUS at 08:17

## 2017-02-22 RX ADMIN — HYDROCODONE BITARTRATE AND ACETAMINOPHEN 1 TABLET: 5; 325 TABLET ORAL at 06:43

## 2017-02-22 RX ADMIN — SENNOSIDES 1 TABLET: 8.6 TABLET ORAL at 08:16

## 2017-02-22 RX ADMIN — FUROSEMIDE 10 MG: 20 TABLET ORAL at 08:16

## 2017-02-22 RX ADMIN — LITHIUM CARBONATE 150 MG: 150 CAPSULE, GELATIN COATED ORAL at 17:34

## 2017-02-22 NOTE — PROGRESS NOTES
LOS: 5 days       Chief Complaint :   Shortness of breath     Subjective     Interval History:     Patient Complaints:  Shona Aguilar  is complaining of worsening shortness of breath, worsening cough, worsening productive cough and wheezing this morning.  She continues with shortness breath with any exertion.  She describes inability perform her activities of daily living.  She also describes subjective fevers chills.  She denies any chest pain.  She does describe occasional palpitations.  No other complaints are noted.  Nurses describe an otherwise uneventful night.         Review of Systems-unchanged since admission history and physical  Objective     Vital Signs  Temp:  [98.3 °F (36.8 °C)-98.7 °F (37.1 °C)] 98.3 °F (36.8 °C)  Heart Rate:  [68-88] 76  Resp:  [20-26] 24  BP: (122-128)/(66-70) 128/70      Physical Exam    Constitutional: She is oriented to person, place, and time.  Respiratory distress with any exertion.  HENT:   Head: Normocephalic and atraumatic.   Right Ear: External ear normal.   Left Ear: External ear normal.   Eyes: Conjunctivae and EOM are normal. Pupils are equal, round, and reactive to light. Right eye exhibits no discharge. Left eye exhibits no discharge. No scleral icterus.   Neck: Normal range of motion. Neck supple. No JVD present. No tracheal deviation present. No thyromegaly present.   Cardiovascular: Normal rate, regular rhythm, normal heart sounds and intact distal pulses. Exam reveals no gallop and no friction rub.   No murmur heard.  Pulmonary/Chest: No stridor. She has no rales. She exhibits no tenderness.    Respiratory distress with any conversation. Rhonchi and wheezing bilaterally. Diminished breath sounds bilaterally. Increased fremitus bilaterally.   Abdominal: Soft. Bowel sounds are normal. She exhibits no distension and no mass. There is no tenderness. There is no guarding. No hernia.   Genitourinary:   Genitourinary Comments: No Liu catheter   Musculoskeletal: She  exhibits no edema, tenderness or deformity.   Lymphadenopathy:   She has no cervical adenopathy.   Neurological: She is alert and oriented to person, place, and time. She has normal reflexes. She displays normal reflexes. No cranial nerve deficit. She exhibits normal muscle tone. Coordination normal.   Skin: No rash noted. No erythema. No pallor.   Psychiatric: She has a normal mood and affect.   Nursing note and vitals reviewed.      Results Review:     I reviewed the patient's new clinical results  : See Below  MEDICATIONS:    ARIPiprazole 5 mg Oral Daily   aspirin 81 mg Oral Daily   atorvastatin 10 mg Oral Daily   budesonide 0.5 mg Nebulization BID - RT   digoxin 125 mcg Oral Daily   diltiaZEM 120 mg Oral Daily   donepezil 10 mg Oral Nightly   enoxaparin 30 mg Subcutaneous Daily   furosemide 10 mg Oral Daily   ipratropium-albuterol 3 mL Nebulization Q6H - RT   lithium carbonate 150 mg Oral BID With Meals   LORazepam 0.5 mg Oral 2 times per day   LORazepam 1 mg Oral QAM   megestrol 200 mg Oral Daily   meropenem 1 g Intravenous Q12H   metoprolol tartrate 50 mg Oral Q12H - RT   mirtazapine 30 mg Oral Nightly   pantoprazole 40 mg Oral Daily   senna 1 tablet Oral Daily       LABS:    Lab Results   Component Value Date    GLUCOSE 130 (H) 02/22/2017    GLUCOSE 102 02/21/2017    GLUCOSE 122 (H) 02/19/2017    BUN 22 (H) 02/22/2017    CREATININE 0.39 (L) 02/22/2017    EGFRIFNONA >150 02/22/2017    EGFRIFAFRI  09/13/2016      Comment:      <15 Indicative of kidney failure.    BCR 56.4 (H) 02/22/2017    CO2 35.4 (H) 02/22/2017    CALCIUM 9.2 02/22/2017    ALBUMIN 3.90 02/22/2017    LABIL2 1.4 (L) 02/22/2017    AST 14 02/22/2017    ALT 13 02/22/2017    WBC 11.82 02/22/2017    WBC 11.01 02/21/2017    WBC 13.42 (H) 02/20/2017    HGB 12.4 02/22/2017    HCT 41.5 02/22/2017    .0 (H) 02/22/2017     02/22/2017     02/22/2017     02/21/2017     02/19/2017    K 3.5 02/22/2017    K 4.3 02/21/2017    K  4.2 02/19/2017     02/22/2017    ANIONGAP 7.6 02/22/2017       Lab Results   Component Value Date    INR 0.93 09/13/2016    INR <0.90 12/14/2015    INR 0.91 12/11/2015    PROTIME 10.5 09/13/2016    PROTIME 10.0 12/14/2015    PROTIME 10.3 12/11/2015             Assessment/Plan    Active Problems:      1) Pneumonia-left lower lobe-symptoms worsening.  Escalating antibiotics February 22.  Poor prognosis for rapid recovery.  Continued care consult for poor 22nd.  Transfers accepted.  2) COPD exacerbation  3) acute on chronic respiratory failure with decreased saturations and respiratory distress from baseline.  4) early sepsis  5) lactic acidosis  6) history of paroxysmal atrial fibrillation  7) history of bipolar disorder  8) l chronic limited mobility secondary to respiratory distress  9) leukocytosis  10) DVT prophylaxis-subcutaneous Lovenox  11) stress gastritis prophylaxis-on Protonix orally.   See orders entered.     Mohamud Acosta MD  02/22/17  8:14 AM

## 2017-02-22 NOTE — PLAN OF CARE
Problem: Fluid Volume Deficit (Adult)  Goal: Identify Related Risk Factors and Signs and Symptoms  Outcome: Ongoing (interventions implemented as appropriate)  Goal: Fluid/Electrolyte Balance  Outcome: Ongoing (interventions implemented as appropriate)  Goal: Comfort/Well Being  Outcome: Ongoing (interventions implemented as appropriate)    Problem: Fall Risk (Adult)  Goal: Identify Related Risk Factors and Signs and Symptoms  Outcome: Ongoing (interventions implemented as appropriate)  Goal: Absence of Falls  Outcome: Ongoing (interventions implemented as appropriate)    Problem: Respiratory Insufficiency (Adult)  Goal: Identify Related Risk Factors and Signs and Symptoms  Outcome: Ongoing (interventions implemented as appropriate)  Goal: Acid/Base Balance  Outcome: Ongoing (interventions implemented as appropriate)  Goal: Effective Ventilation  Outcome: Ongoing (interventions implemented as appropriate)    Problem: Patient Care Overview (Adult)  Goal: Plan of Care Review  Outcome: Ongoing (interventions implemented as appropriate)    Problem: Pain, Acute (Adult)  Goal: Identify Related Risk Factors and Signs and Symptoms  Outcome: Ongoing (interventions implemented as appropriate)  Goal: Acceptable Pain Control/Comfort Level  Outcome: Ongoing (interventions implemented as appropriate)

## 2017-02-22 NOTE — DISCHARGE PLACEMENT REQUEST
"Shona Aguilar (71 y.o. Female)     Date of Birth Social Security Number Address Home Phone MRN    1945  [facility]  SHARON ARGUETA [facility] [address] [phone] [id number]    Rastafarian Marital Status          None        Admission Date Admission Type Admitting Provider Attending Provider Department, Room/Bed    2/17/17 Emergency Mohamud Acosta MD Morton, Steven E, MD [address], [address]/    Discharge Date Discharge Disposition Discharge Destination                      Attending Provider: Mohamud Acosta MD     Allergies:  No Known Allergies    Isolation:  None   Infection:  None   Code Status:  FULL    Ht:  62\" (157.5 cm)   Wt:  105 lb 1 oz (47.7 kg)    Admission Cmt:  None   Principal Problem:  None                Active Insurance as of 2/17/2017     Primary Coverage     Payor Plan Insurance Group Employer/Plan Group    MEDICARE MEDICARE A & B      Payor Plan Address Payor Plan Phone Number Effective From Effective To    [address] [phone] 3/1/2010     [place] [address]       Subscriber Name Subscriber Birth Date Member ID       SHONA AGUILAR 1945 [id number]           Secondary Coverage     Payor Plan Insurance Group Employer/Plan Group    [place]      Payor Plan Address Payor Plan Phone Number Effective From Effective To    [address] [phone] 9/13/2016     [place] [address]       Subscriber Name Subscriber Birth Date Member ID       SHONA AGUILAR 1945 [id number]                 Emergency Contacts      (Rel.) Home Phone Work Phone Mobile Phone    Pauly Aguilar (Brother) [phone] -- --               History & Physical      Mohamud Acosta MD at 2/17/2017  5:22 PM                  Chief complaint shortness of breath    Subjective     Patient is a 71 y.o. female resident of the [facility] who was noted to have oxygen desaturation and respiratory distress as well as confusion.  She " was sent to the emergency room for evaluation.  In the emergency room the patient was seen and evaluated and diagnosed with pneumonia.  Is been started on intravenous antibiotics.  His also been treated with nebulizer treatments and supplemental oxygen.   is currently arousable and talking.  She continues with complaints of shortness of breath, cough, congestion and generalized weakness.  She denies any hemoptysis.  She describes having an improvement since the time of admission area unfortunately, she has chronic shortness of breath and at baseline has chronic respiratory distress with any exertion or position change.  I discussed with the nurses at the nursing home and they reported the patient was having progressive symptoms 3-4 hours prior to admission or transfer to [facility].    Review of Systems   Review of Systems   Constitutional: Positive for activity change and fatigue. Negative for appetite change, chills, fever and unexpected weight change.   HENT: Positive for congestion. Negative for drooling, hearing loss, mouth sores, nosebleeds, postnasal drip, rhinorrhea, sinus pressure, sneezing and sore throat.    Eyes: Negative for pain, discharge, redness, itching and visual disturbance.   Respiratory: Positive for cough, shortness of breath and wheezing. Negative for apnea and choking.    Cardiovascular: Positive for leg swelling. Negative for chest pain and palpitations.   Gastrointestinal: Negative for abdominal distention, abdominal pain and anal bleeding.   Endocrine: Negative for cold intolerance, heat intolerance, polydipsia, polyphagia and polyuria.   Genitourinary: Negative for difficulty urinating, dysuria, enuresis, frequency, hematuria, urgency and vaginal pain.   Skin: Negative for color change, pallor, rash and wound.   Allergic/Immunologic: Negative for environmental allergies, food allergies and immunocompromised state.   Neurological: Positive for weakness. Negative for  dizziness, tremors, seizures, facial asymmetry, light-headedness and headaches.   Psychiatric/Behavioral: Positive for confusion and dysphoric mood. Negative for agitation, behavioral problems, hallucinations, self-injury, sleep disturbance and suicidal ideas. The patient is not nervous/anxious and is not hyperactive.         Past Medical History  Past Medical History   Diagnosis Date   • Anxiety    • COPD (chronic obstructive pulmonary disease)    • Depression    • GERD (gastroesophageal reflux disease)    • Hyperlipidemia    • Hypertension      Surgical History  Past Surgical History   Procedure Laterality Date   • Hip fracture surgery Right    • Abdominal surgery     • Cholecystectomy     • Appendectomy       Family History  History reviewed. No pertinent family history.  Social History  Social History   Substance Use Topics   • Smoking status: Former Smoker     Quit date: 1/8/2015   • Smokeless tobacco: None   • Alcohol use No     Home Medications  Prescriptions Prior to Admission   Medication Sig Dispense Refill Last Dose   • acetaminophen (TYLENOL) 500 MG tablet Take 1,000 mg by mouth every 6 (six) hours as needed for mild pain (1-3).   2/16/2017 at 2000   • albuterol (PROVENTIL) (2.5 MG/3ML) 0.083% nebulizer solution Take 2.5 mg by nebulization every 4 (four) hours as needed for wheezing. 30 vial 0 2/17/2017 at 0800   • ARIPiprazole (ABILIFY) 5 MG tablet Take 5 mg by mouth daily.   2/17/2017 at 0800   • aspirin 81 MG chewable tablet Chew 81 mg daily.   2/17/2017 at 0800   • atorvastatin (LIPITOR) 10 MG tablet Take 10 mg by mouth daily.   2/17/2017 at 0800   • budesonide-formoterol (SYMBICORT) 160-4.5 MCG/ACT inhaler Inhale 2 puffs every 12 (twelve) hours.   2/17/2017 at 0800   • cyanocobalamin 1000 MCG/ML injection Inject 1,000 mcg into the shoulder, thigh, or buttocks 1 (One) Time. On 15th   2/15/2017 at 0800   • digoxin (LANOXIN) 125 MCG tablet Take 125 mcg by mouth every day.   2/17/2017 at 0800   •  diltiazem (CARDIZEM) 120 MG tablet Take 120 mg by mouth daily.   2/17/2017 at 0800   • donepezil (ARICEPT) 5 MG tablet Take 10 mg by mouth Every Night.   2/16/2017 at 2000   • escitalopram (LEXAPRO) 20 MG tablet Take 20 mg by mouth daily.   2/17/2017 at 0800   • furosemide (LASIX) 20 MG tablet Take 10 mg by mouth daily.   2/17/2017 at 0800   • lithium 8 MEQ/5ML solution oral solution Take 100 mg by mouth 3 (three) times a day.   2/17/2017 at 1200   • LORazepam (ATIVAN) 0.5 MG tablet Take 1 mg by mouth Every Morning.   2/17/2017 at 0800   • LORazepam (ATIVAN) 0.5 MG tablet Take 0.5 mg by mouth Take As Directed. Takes at 1200 and 2000   2/17/2017 at 1200   • megestrol (MEGACE) 40 MG/ML suspension Take 200 mg by mouth Daily.   2/17/2017 at 0800   • metoprolol tartrate (LOPRESSOR) 50 MG tablet Take 50 mg by mouth 2 (Two) Times a Day. Hold id systolic <110   2/17/2017 at 0800   • mirtazapine (REMERON) 30 MG tablet Take 30 mg by mouth every night.   2/16/2017 at 2000   • ondansetron (ZOFRAN) 4 MG tablet Take 4 mg by mouth every 6 (six) hours as needed for nausea or vomiting.   2/16/2017 at 0800   • pantoprazole (PROTONIX) 40 MG EC tablet Take 40 mg by mouth daily.   2/17/2017 at 0800   • predniSONE (DELTASONE) 10 MG tablet Take 10 mg by mouth every other day.   2/17/2017 at 0800   • senna (Senna) 8.6 MG tablet Take 1 tablet by mouth daily.   2/17/2017 at 0800   • tiotropium (SPIRIVA) 18 MCG per inhalation capsule Place 1 capsule into inhaler and inhale 1 (one) time daily.   2/17/2017 at 0800   • traMADol (ULTRAM) 50 MG tablet Take 50 mg by mouth 2 (Two) Times a Day As Needed for moderate pain (4-6).   2/17/2017 at 0800   • albuterol (PROVENTIL HFA;VENTOLIN HFA) 108 (90 BASE) MCG/ACT inhaler Inhale 2 puffs every 4 (four) hours as needed for wheezing.   Unknown at Unknown time   • nitroglycerin (NITROSTAT) 0.4 MG SL tablet Place 0.4 mg under the tongue every 5 (five) minutes as needed for chest pain. Take no more than 3  doses in 15 minutes.   Unknown at Unknown time   • Nystatin (MAGIC MOUTHWASH) Swish and spit 5 mL 4 (four) times a day as needed.   Unknown at Unknown time         Allergies:  Review of patient's allergies indicates no known allergies.    Objective     Vital Signs  Temp:  [98.3 °F (36.8 °C)-98.7 °F (37.1 °C)] 98.3 °F (36.8 °C)  Heart Rate:  [] 74  Resp:  [24-28] 24  BP: ()/(47-73) 120/58    Physical Exam:    Physical Exam   Constitutional: She is oriented to person, place, and time.   HENT:   Head: Normocephalic and atraumatic.   Right Ear: External ear normal.   Left Ear: External ear normal.   Eyes: Conjunctivae and EOM are normal. Pupils are equal, round, and reactive to light. Right eye exhibits no discharge. Left eye exhibits no discharge. No scleral icterus.   Neck: Normal range of motion. Neck supple. No JVD present. No tracheal deviation present. No thyromegaly present.   Cardiovascular: Normal rate, regular rhythm, normal heart sounds and intact distal pulses.  Exam reveals no gallop and no friction rub.    No murmur heard.  Pulmonary/Chest: No stridor. She has no rales. She exhibits no tenderness.   Respiratory distress with any conversation.  Rhonchi and wheezing bilaterally.  Diminished breath sounds bilaterally.  Increased fremitus bilaterally.   Abdominal: Soft. Bowel sounds are normal. She exhibits no distension and no mass. There is no tenderness. There is no guarding. No hernia.   Genitourinary:   Genitourinary Comments: No Liu catheter   Musculoskeletal: She exhibits no edema, tenderness or deformity.   Lymphadenopathy:     She has no cervical adenopathy.   Neurological: She is alert and oriented to person, place, and time. She has normal reflexes. She displays normal reflexes. No cranial nerve deficit. She exhibits normal muscle tone. Coordination normal.   Skin: No rash noted. No erythema. No pallor.   Psychiatric: She has a normal mood and affect.   Nursing note and vitals  reviewed.      Results Review:    I reviewed the patient's clinical results from admission:  Imaging Results (last 72 hours)     Procedure Component Value Units Date/Time    XR Chest 1 View [[id number]] Collected:  02/17/17 1047     Updated:  02/17/17 1049    Narrative:       EXAMINATION:  XR CHEST 1 VW-      CLINICAL INDICATION:     Simple sepsis protocol     TECHNIQUE:  XR CHEST 1 VW-       COMPARISON: 9/13/2016      FINDINGS:   Left lower lobe pneumonia.   Heart size is stable.   No pneumothorax.   No pleural effusion.   Bony and soft tissue structures are unremarkable.            Impression:       Stable radiographic appearance of the chest.     This report was finalized on 2/17/2017 10:47 AM by Dr. Zane Carlson MD.           Lab Results (last 72 hours)     Procedure Component Value Units Date/Time    Blood Gas, Arterial With Co-Ox [[id number]]  (Abnormal) Collected:  02/17/17 0946    Specimen:  Arterial Blood Updated:  02/17/17 0955     Site Arterial: left brachial      Dandre's Test N/A      pH, Arterial 7.455 (H) pH units      pCO2, Arterial 46.5 (H) mm Hg      pO2, Arterial 41.1 (C) mm Hg      HCO3, Arterial 32.0 (C) mmol/L      Base Excess, Arterial 7.0 mmol/L      O2 Saturation, Arterial 80.0 (C) %      Hemoglobin, Blood Gas 13.2 g/dL      Hematocrit, Blood Gas 39.0 %      Oxyhemoglobin 78.7 (L) %      Methemoglobin 0.3 %      Carboxyhemoglobin 1.3 %      A-a Gradiant 121.5 mmHg      Temperature 98.6 C      Barometric Pressure for Blood Gas 725 mmHg      Modality Cannula - Nasal      FIO2 32 %      Flow Rate 3 lpm     CBC & Differential [[id number]] Collected:  02/17/17 1015    Specimen:  Blood Updated:  02/17/17 1031    Narrative:       The following orders were created for panel order CBC & Differential.  Procedure                               Abnormality         Status                     ---------                               -----------         ------                     CBC Auto Differential[[id number]]          Abnormal            Final result                 Please view results for these tests on the individual orders.    CBC Auto Differential [[id number]]  (Abnormal) Collected:  02/17/17 1015    Specimen:  Blood from Arm, Left Updated:  02/17/17 1031     WBC 15.11 (H) 10*3/mm3      RBC 4.36 10*6/mm3      Hemoglobin 13.4 g/dL      Hematocrit 43.9 %      .7 (H) fL      MCH 30.7 pg      MCHC 30.5 (L) g/dL      RDW 13.8 %      RDW-SD 51.1 fl      MPV 10.6 (H) fL      Platelets 270 10*3/mm3      Neutrophil % 72.3 %      Lymphocyte % 16.7 %      Monocyte % 9.0 %      Eosinophil % 1.2 %      Basophil % 0.3 %      Immature Grans % 0.5 %      Neutrophils, Absolute 10.93 (H) 10*3/mm3      Lymphocytes, Absolute 2.52 10*3/mm3      Monocytes, Absolute 1.36 (H) 10*3/mm3      Eosinophils, Absolute 0.18 10*3/mm3      Basophils, Absolute 0.04 10*3/mm3      Immature Grans, Absolute 0.08 (H) 10*3/mm3     [facility] [[id number]] Collected:  02/17/17 1015    Specimen:  Blood from Arm, Left Updated:  02/17/17 1037     Extra Tube --     Lactic Acid, Plasma [[id number]]  (Abnormal) Collected:  02/17/17 1015    Specimen:  Blood from Arm, Left Updated:  02/17/17 1047     Lactate 3.7 (C) mmol/L     Blood Culture [[id number]] Collected:  02/17/17 1035    Specimen:  Blood from Arm, Left Updated:  02/17/17 1048    Comprehensive Metabolic Panel [[id number]]  (Abnormal) Collected:  02/17/17 1015    Specimen:  Blood from Arm, Left Updated:  02/17/17 1051     Glucose 93 mg/dL      BUN 14 mg/dL      Creatinine 0.46 mg/dL      Sodium 144 mmol/L      Potassium 4.3 mmol/L       1+ Hemolysis         Chloride 103 mmol/L      CO2 34.7 (H) mmol/L      Calcium 10.1 (H) mg/dL      Total Protein 7.8 g/dL      Albumin 4.90 (H) g/dL      ALT (SGPT) 16 U/L      AST (SGOT) 28 U/L      Alkaline Phosphatase 51 U/L       Note New Reference Ranges        Total Bilirubin 0.4 mg/dL      eGFR Non African Amer 134 mL/min/1.73      Globulin 2.9 gm/dL      A/G Ratio 1.7  g/dL      BUN/Creatinine Ratio 30.4 (H)      Anion Gap 6.3 mmol/L     Narrative:       The MDRD GFR formula is only valid for adults with stable renal function between ages 18 and 70.    Osmolality, Calculated [[id number]]  (Normal) Collected:  02/17/17 1015    Specimen:  Blood from Arm, Left Updated:  02/17/17 1051     Osmolality Calc 287.0 mOsm/kg     Blood Culture [[id number]] Collected:  02/17/17 1113    Specimen:  Blood from Arm, Right Updated:  02/17/17 1141    Lactate Acid, Reflex [[id number]]  (Normal) Collected:  02/17/17 1351    Specimen:  Blood Updated:  02/17/17 1446     Lactate 1.3 mmol/L     [facility] Draw [[id number]] Collected:  02/17/17 1015    Specimen:  Blood Updated:  02/17/17 1501    Narrative:       The following orders were created for panel order [facility] Draw.  Procedure                               Abnormality         Status                     ---------                               -----------         ------                     Light Blue Top[[id number]]                                    Final result               Green Top (Gel)[[id number]]                                                              Lavender Top[[id number]]                                      Final result               Gold Top - SST[[id number]]                                    Final result                 Please view results for these tests on the individual orders.    Light Blue Top [[id number]] Collected:  02/17/17 1015    Specimen:  Blood from Arm, Left Updated:  02/17/17 1501     Extra Tube hold for add-on       Auto resulted       Lavender Top [[id number]] Collected:  02/17/17 1015    Specimen:  Blood from Arm, Left Updated:  02/17/17 1501     Extra Tube hold for add-on       Auto resulted                 Assessment/Plan     Active Problems:   1) Pneumonia-left lower lobe-blood cultures obtained and are pending.  2) COPD exacerbation  3) acute on chronic respiratory failure with decreased saturations and respiratory distress from  baseline.  4) early sepsis  5) lactic acidosis  6) history of paroxysmal atrial fibrillation  7) history of bipolar disorder  8) l chronic limited mobility secondary to respiratory distress  9) leukocytosis  10) DVT prophylaxis-subcutaneous Lovenox  11) stress gastritis prophylaxis-on Protonix orally.          I discussed the patients findings and my recommendations with patient and nursing staff.     Mohamud Acosta MD  02/17/17  5:22 PM    Time: 33 minutes of time with history and physical and coordination of care     Electronically signed by Mohamud Acosta MD at 2/17/2017  5:31 PM        Vital Signs (last 24 hours)       02/21 0700  -  02/22 0659 02/22 0700  -  02/22 0919   Most Recent    Temp (°F) 98.5 -  98.7      98.3     98.3 (36.8)    Heart Rate 68 -  88    76 -  88     76    Resp 20 -  26    24 -  26     24    /66 -  142/82      128/70     128/70    SpO2 (%) 92 -  97    94 -  95     94          Intake & Output (last day)       02/21 0701 - 02/22 0700 02/22 0701 - 02/23 0700    P.O. 1080 480    Total Intake(mL/kg) 1080 (22.7) 480 (10.1)    Urine (mL/kg/hr)      Stool      Total Output        Net +1080 +480          Unmeasured Urine Occurrence 7 x     Unmeasured Stool Occurrence 0 x         Hospital Medications (active)       Dose Frequency Start End    acetaminophen (TYLENOL) tablet 650 mg 650 mg Every 6 Hours PRN 2/17/2017     Sig - Route: Take 2 tablets by mouth Every 6 (Six) Hours As Needed for mild pain (1-3). - Oral    ARIPiprazole (ABILIFY) tablet 5 mg 5 mg Daily 2/18/2017     Sig - Route: Take 0.5 tablets by mouth Daily. - Oral    aspirin chewable tablet 81 mg 81 mg Daily 2/18/2017     Sig - Route: Chew 1 tablet Daily. - Oral    atorvastatin (LIPITOR) tablet 10 mg 10 mg Daily 2/18/2017     Sig - Route: Take 1 tablet by mouth Daily. - Oral    budesonide (PULMICORT) nebulizer solution 0.5 mg 0.5 mg 2 Times Daily - RT 2/20/2017     Sig - Route: Take 2 mL by nebulization 2 (Two) Times a Day. -  Nebulization    digoxin (LANOXIN) tablet 125 mcg 125 mcg Daily Digoxin 2/18/2017     Sig - Route: Take 1 tablet by mouth Daily. - Oral    diltiaZEM (CARDIZEM) tablet 120 mg 120 mg Daily 2/18/2017     Sig - Route: Take 2 tablets by mouth Daily. - Oral    donepezil (ARICEPT) tablet 10 mg 10 mg Nightly 2/17/2017     Sig - Route: Take 2 tablets by mouth Every Night. - Oral    enoxaparin (LOVENOX) syringe 30 mg 30 mg Daily 2/17/2017     Sig - Route: Inject 0.3 mL under the skin Daily. - Subcutaneous    furosemide (LASIX) injection 20 mg 20 mg Once 2/21/2017 2/21/2017    Sig - Route: Infuse 2 mL into a venous catheter 1 (One) Time. - Intravenous    furosemide (LASIX) tablet 10 mg 10 mg Daily 2/18/2017     Sig - Route: Take 0.5 tablets by mouth Daily. - Oral    HYDROcodone-acetaminophen (NORCO) 5-325 MG per tablet 1 tablet 1 tablet Every 6 Hours PRN 2/18/2017 2/28/2017    Sig - Route: Take 1 tablet by mouth Every 6 (Six) Hours As Needed for moderate pain (4-6). - Oral    ipratropium-albuterol (DUO-NEB) nebulizer solution 3 mL 3 mL Every 6 Hours - RT 2/17/2017     Sig - Route: Take 3 mL by nebulization Every 6 (Six) Hours. - Nebulization    lithium carbonate capsule 150 mg 150 mg 2 Times Daily With Meals 2/17/2017     Sig - Route: Take 1 capsule by mouth 2 (Two) Times a Day With Meals. - Oral    LORazepam (ATIVAN) tablet 0.5 mg 0.5 mg User Specified 2/17/2017     Sig - Route: Take 1 tablet by mouth 2 times per day. - Oral    LORazepam (ATIVAN) tablet 1 mg 1 mg Every Morning 2/18/2017     Sig - Route: Take 1 tablet by mouth Every Morning. - Oral    megestrol (MEGACE) 40 MG/ML suspension 200 mg 200 mg Daily 2/18/2017     Sig - Route: Take 5 mL by mouth Daily. - Oral    meropenem (MERREM) 1 g/100 mL 0.9% NS VTB (mbp) 1 g Every 12 Hours 2/22/2017     Sig - Route: Infuse 100 mL into a venous catheter Every 12 (Twelve) Hours. - Intravenous    metoprolol tartrate (LOPRESSOR) tablet 50 mg 50 mg Every 12 Hours - RT 2/17/2017     Sig  - Route: Take 1 tablet by mouth Every 12 (Twelve) Hours. - Oral    mirtazapine (REMERON) tablet 30 mg 30 mg Nightly 2/17/2017     Sig - Route: Take 2 tablets by mouth Every Night. - Oral    nitroglycerin (NITROSTAT) SL tablet 0.4 mg 0.4 mg Every 5 Minutes PRN 2/17/2017     Sig - Route: Place 1 tablet under the tongue Every 5 (Five) Minutes As Needed for chest pain. - Sublingual    nystatin susp + lidocaine viscous (MAGIC MOUTHWASH) oral suspension 5 mL 4 Times Daily PRN 2/17/2017     Sig - Route: Swish and swallow 5 mL 4 (Four) Times a Day As Needed (mouth sores). - Swish & Swallow    ondansetron (ZOFRAN) tablet 4 mg 4 mg Every 6 Hours PRN 2/17/2017     Sig - Route: Take 1 tablet by mouth Every 6 (Six) Hours As Needed for nausea or vomiting. - Oral    pantoprazole (PROTONIX) EC tablet 40 mg 40 mg Daily 2/18/2017     Sig - Route: Take 1 tablet by mouth Daily. - Oral    Pharmacy to dose vancomycin  Continuous PRN 2/22/2017     Sig - Route: Continuous As Needed for consult. - Does not apply    senna (SENOKOT) tablet 1 tablet 1 tablet Daily 2/18/2017     Sig - Route: Take 1 tablet by mouth Daily. - Oral    sodium chloride 0.9 % flush 10 mL 10 mL As Needed 2/17/2017     Sig - Route: Infuse 10 mL into a venous catheter As Needed for line care. - Intravenous    traMADol (ULTRAM) tablet 50 mg 50 mg 2 Times Daily PRN 2/17/2017     Sig - Route: Take 1 tablet by mouth 2 (Two) Times a Day As Needed for moderate pain (4-6). - Oral    vancomycin (VANCOCIN) 1,000 mg in sodium chloride 0.9 % 250 mL IVPB 1,000 mg Once 2/22/2017     Sig - Route: Infuse 1,000 mg into a venous catheter 1 (One) Time. - Intravenous    vancomycin (VANCOCIN) 750 mg in sodium chloride 0.9 % 250 mL IVPB 750 mg Every 18 Hours 2/23/2017     Sig - Route: Infuse 750 mg into a venous catheter Every 18 (Eighteen) Hours. - Intravenous    cefpodoxime (VANTIN) tablet 200 mg (Discontinued) 200 mg Every 12 Hours Scheduled 2/20/2017 2/22/2017    Sig - Route: Take 1  tablet by mouth Every 12 (Twelve) Hours. - Oral    predniSONE (DELTASONE) tablet 60 mg (Discontinued) 60 mg Daily With Breakfast 2/20/2017 2/22/2017    Sig - Route: Take 3 tablets by mouth Daily With Breakfast. - Oral            Lab Results (last 24 hours)     Procedure Component Value Units Date/Time    Blood Culture [[id number]]  (Normal) Collected:  02/17/17 1035    Specimen:  Blood from Arm, Left Updated:  02/21/17 1102     Blood Culture No growth at 4 days     Blood Culture [[id number]]  (Normal) Collected:  02/17/17 1113    Specimen:  Blood from Arm, Right Updated:  02/21/17 1201     Blood Culture No growth at 4 days     CBC & Differential [[id number]] Collected:  02/22/17 0133    Specimen:  Blood Updated:  02/22/17 0216    Narrative:       The following orders were created for panel order CBC & Differential.  Procedure                               Abnormality         Status                     ---------                               -----------         ------                     CBC Auto Differential[[id number]]         Abnormal            Final result                 Please view results for these tests on the individual orders.    CBC Auto Differential [[id number]]  (Abnormal) Collected:  02/22/17 0133    Specimen:  Blood Updated:  02/22/17 0216     WBC 11.82 10*3/mm3      RBC 4.15 (L) 10*6/mm3      Hemoglobin 12.4 g/dL      Hematocrit 41.5 %      .0 (H) fL      MCH 29.9 pg      MCHC 29.9 (L) g/dL      RDW 13.2 %      RDW-SD 47.0 fl      MPV 10.0 fL      Platelets 266 10*3/mm3      Neutrophil % 82.9 (H) %      Lymphocyte % 9.5 (L) %      Monocyte % 7.0 %      Eosinophil % 0.1 %      Basophil % 0.1 %      Immature Grans % 0.4 %      Neutrophils, Absolute 9.80 (H) 10*3/mm3      Lymphocytes, Absolute 1.12 10*3/mm3      Monocytes, Absolute 0.83 10*3/mm3      Eosinophils, Absolute 0.01 10*3/mm3      Basophils, Absolute 0.01 10*3/mm3      Immature Grans, Absolute 0.05 (H) 10*3/mm3     Osmolality, Calculated  [[id number]]  (Normal) Collected:  02/22/17 0133    Specimen:  Blood Updated:  02/22/17 0236     Osmolality Calc 295.6 mOsm/kg     Comprehensive Metabolic Panel [[id number]]  (Abnormal) Collected:  02/22/17 0133    Specimen:  Blood Updated:  02/22/17 0236     Glucose 130 (H) mg/dL      BUN 22 (H) mg/dL      Creatinine 0.39 (L) mg/dL      Sodium 146 mmol/L      Potassium 3.5 mmol/L      Chloride 103 mmol/L      CO2 35.4 (H) mmol/L      Calcium 9.2 mg/dL      Total Protein 6.7 g/dL      Albumin 3.90 g/dL      ALT (SGPT) 13 U/L      AST (SGOT) 14 U/L      Alkaline Phosphatase 52 U/L       Note New Reference Ranges        Total Bilirubin 0.3 mg/dL      eGFR Non African Amer >150 mL/min/1.73      Globulin 2.8 gm/dL      A/G Ratio 1.4 (L) g/dL      BUN/Creatinine Ratio 56.4 (H)      Anion Gap 7.6 mmol/L     Narrative:       The MDRD GFR formula is only valid for adults with stable renal function between ages 18 and 70.    C-reactive Protein [[id number]]  (Normal) Collected:  02/22/17 0133    Specimen:  Blood Updated:  02/22/17 0238     C-Reactive Protein <0.50 mg/dL     BNP [[id number]]  (Abnormal) Collected:  02/22/17 0134    Specimen:  Blood Updated:  02/22/17 0240     .0 (H) pg/mL         Imaging Results (last 24 hours)     ** No results found for the last 24 hours. **        Orders (last 24 hrs)     Start     Ordered    02/23/17 0600  Magnesium  Morning Draw      02/22/17 0818    02/23/17 0600  Phosphorus  Morning Draw      02/22/17 0818    02/23/17 0600  vancomycin (VANCOCIN) 750 mg in sodium chloride 0.9 % 250 mL IVPB  Every 18 Hours      02/22/17 0837    02/23/17 0100  CBC & Differential  Morning Draw      02/22/17 0818    02/23/17 0100  Comprehensive Metabolic Panel  Morning Draw      02/22/17 0818    02/23/17 0100  C-reactive Protein  Morning Draw      02/22/17 0818    02/23/17 0100  BNP  Morning Draw      02/22/17 0818    02/23/17 0100  Digoxin Level  Morning Draw      02/22/17 0818    02/22/17 1000  vancomycin  (VANCOCIN) 1,000 mg in sodium chloride 0.9 % 250 mL IVPB  Once      02/22/17 0837    02/22/17 0900  meropenem (MERREM) 1 g/100 mL 0.9% NS VTB (mbp)  Every 12 Hours      02/22/17 0814    02/22/17 0814  Inpatient Consult to Continue Care Hospital Referral  Once     Provider:  (Not yet assigned)    02/22/17 0814    02/22/17 0813  Pharmacy to dose vancomycin  Continuous PRN      02/22/17 0814    02/22/17 0237  Osmolality, Calculated  Once      02/22/17 0236    02/22/17 0100  CBC & Differential  Morning Draw      02/21/17 0644    02/22/17 0100  Comprehensive Metabolic Panel  Morning Draw      02/21/17 0644    02/22/17 0100  C-reactive Protein  Morning Draw      02/21/17 0644    02/22/17 0100  BNP  Morning Draw      02/21/17 0644    02/22/17 0100  CBC Auto Differential  PROCEDURE ONCE      02/21/17 1900    02/21/17 0715  furosemide (LASIX) injection 20 mg  Once      02/21/17 0644    02/20/17 0930  budesonide (PULMICORT) nebulizer solution 0.5 mg  2 Times Daily - RT      02/20/17 0647    02/20/17 0900  cefpodoxime (VANTIN) tablet 200 mg  Every 12 Hours Scheduled,   Status:  Discontinued      02/20/17 0647    02/20/17 0800  predniSONE (DELTASONE) tablet 60 mg  Daily With Breakfast,   Status:  Discontinued      02/20/17 0647    02/18/17 1200  digoxin (LANOXIN) tablet 125 mcg  Daily Digoxin      02/17/17 1730 02/18/17 0927  HYDROcodone-acetaminophen (NORCO) 5-325 MG per tablet 1 tablet  Every 6 Hours PRN      02/18/17 0927    02/18/17 0900  ARIPiprazole (ABILIFY) tablet 5 mg  Daily      02/17/17 1730    02/18/17 0900  aspirin chewable tablet 81 mg  Daily      02/17/17 1730    02/18/17 0900  atorvastatin (LIPITOR) tablet 10 mg  Daily      02/17/17 1730    02/18/17 0900  diltiaZEM (CARDIZEM) tablet 120 mg  Daily      02/17/17 1730 02/18/17 0900  furosemide (LASIX) tablet 10 mg  Daily      02/17/17 1730 02/18/17 0900  megestrol (MEGACE) 40 MG/ML suspension 200 mg  Daily      02/17/17 1730 02/18/17 0900  pantoprazole  (PROTONIX) EC tablet 40 mg  Daily      02/17/17 1730    02/18/17 0900  senna (SENOKOT) tablet 1 tablet  Daily      02/17/17 1730    02/18/17 0700  LORazepam (ATIVAN) tablet 1 mg  Every Morning      02/17/17 1730    02/17/17 2100  donepezil (ARICEPT) tablet 10 mg  Nightly      02/17/17 1730    02/17/17 2100  metoprolol tartrate (LOPRESSOR) tablet 50 mg  Every 12 Hours - RT      02/17/17 1730 02/17/17 2100  mirtazapine (REMERON) tablet 30 mg  Nightly      02/17/17 1730    02/17/17 2000  LORazepam (ATIVAN) tablet 0.5 mg  2 times per day      02/17/17 1730    02/17/17 1900  ipratropium-albuterol (DUO-NEB) nebulizer solution 3 mL  Every 6 Hours - RT      02/17/17 1519    02/17/17 1900  lithium carbonate capsule 150 mg  2 Times Daily With Meals      02/17/17 1730    02/17/17 1800  enoxaparin (LOVENOX) syringe 30 mg  Daily      02/17/17 1720    02/17/17 1725  traMADol (ULTRAM) tablet 50 mg  2 Times Daily PRN      02/17/17 1730    02/17/17 1725  acetaminophen (TYLENOL) tablet 650 mg  Every 6 Hours PRN      02/17/17 1730    02/17/17 1725  nitroglycerin (NITROSTAT) SL tablet 0.4 mg  Every 5 Minutes PRN      02/17/17 1730    02/17/17 1725  nystatin susp + lidocaine viscous (MAGIC MOUTHWASH) oral suspension  4 Times Daily PRN      02/17/17 1730    02/17/17 1725  ondansetron (ZOFRAN) tablet 4 mg  Every 6 Hours PRN      02/17/17 1730    02/17/17 0950  sodium chloride 0.9 % flush 10 mL  As Needed      02/17/17 0952    --  cyanocobalamin 1000 MCG/ML injection  Once      02/17/17 1257    --  LORazepam (ATIVAN) 0.5 MG tablet  Take As Directed      02/17/17 1257    --  megestrol (MEGACE) 40 MG/ML suspension  Daily      02/17/17 1257    --  traMADol (ULTRAM) 50 MG tablet  2 Times Daily PRN      02/17/17 1308          Operative/Procedure Notes (most recent note)     No notes of this type exist for this encounter.           Physician Progress Notes (most recent note)      Mohamud Acosta MD at 2/22/2017  8:14 AM  Version 1 of 1               LOS: 5 days       Chief Complaint :   Shortness of breath     Subjective     Interval History:     Patient Complaints:  Shona Aguilar  is complaining of worsening shortness of breath, worsening cough, worsening productive cough and wheezing this morning.  She continues with shortness breath with any exertion.  She describes inability perform her activities of daily living.  She also describes subjective fevers chills.  She denies any chest pain.  She does describe occasional palpitations.  No other complaints are noted.  Nurses describe an otherwise uneventful night.         Review of Systems-unchanged since admission history and physical  Objective     Vital Signs  Temp:  [98.3 °F (36.8 °C)-98.7 °F (37.1 °C)] 98.3 °F (36.8 °C)  Heart Rate:  [68-88] 76  Resp:  [20-26] 24  BP: (122-128)/(66-70) 128/70      Physical Exam    Constitutional: She is oriented to person, place, and time.  Respiratory distress with any exertion.  HENT:   Head: Normocephalic and atraumatic.   Right Ear: External ear normal.   Left Ear: External ear normal.   Eyes: Conjunctivae and EOM are normal. Pupils are equal, round, and reactive to light. Right eye exhibits no discharge. Left eye exhibits no discharge. No scleral icterus.   Neck: Normal range of motion. Neck supple. No JVD present. No tracheal deviation present. No thyromegaly present.   Cardiovascular: Normal rate, regular rhythm, normal heart sounds and intact distal pulses. Exam reveals no gallop and no friction rub.   No murmur heard.  Pulmonary/Chest: No stridor. She has no rales. She exhibits no tenderness.    Respiratory distress with any conversation. Rhonchi and wheezing bilaterally. Diminished breath sounds bilaterally. Increased fremitus bilaterally.   Abdominal: Soft. Bowel sounds are normal. She exhibits no distension and no mass. There is no tenderness. There is no guarding. No hernia.   Genitourinary:   Genitourinary Comments: No Liu catheter   Musculoskeletal:  She exhibits no edema, tenderness or deformity.   Lymphadenopathy:   She has no cervical adenopathy.   Neurological: She is alert and oriented to person, place, and time. She has normal reflexes. She displays normal reflexes. No cranial nerve deficit. She exhibits normal muscle tone. Coordination normal.   Skin: No rash noted. No erythema. No pallor.   Psychiatric: She has a normal mood and affect.   Nursing note and vitals reviewed.      Results Review:     I reviewed the patient's new clinical results  : See Below  MEDICATIONS:    ARIPiprazole 5 mg Oral Daily   aspirin 81 mg Oral Daily   atorvastatin 10 mg Oral Daily   budesonide 0.5 mg Nebulization BID - RT   digoxin 125 mcg Oral Daily   diltiaZEM 120 mg Oral Daily   donepezil 10 mg Oral Nightly   enoxaparin 30 mg Subcutaneous Daily   furosemide 10 mg Oral Daily   ipratropium-albuterol 3 mL Nebulization Q6H - RT   lithium carbonate 150 mg Oral BID With Meals   LORazepam 0.5 mg Oral 2 times per day   LORazepam 1 mg Oral QAM   megestrol 200 mg Oral Daily   meropenem 1 g Intravenous Q12H   metoprolol tartrate 50 mg Oral Q12H - RT   mirtazapine 30 mg Oral Nightly   pantoprazole 40 mg Oral Daily   senna 1 tablet Oral Daily       LABS:    Lab Results   Component Value Date    GLUCOSE 130 (H) 02/22/2017    GLUCOSE 102 02/21/2017    GLUCOSE 122 (H) 02/19/2017    BUN 22 (H) 02/22/2017    CREATININE 0.39 (L) 02/22/2017    EGFRIFNONA >150 02/22/2017    EGFRIFAFRI  09/13/2016      Comment:      <15 Indicative of kidney failure.    BCR 56.4 (H) 02/22/2017    CO2 35.4 (H) 02/22/2017    CALCIUM 9.2 02/22/2017    ALBUMIN 3.90 02/22/2017    LABIL2 1.4 (L) 02/22/2017    AST 14 02/22/2017    ALT 13 02/22/2017    WBC 11.82 02/22/2017    WBC 11.01 02/21/2017    WBC 13.42 (H) 02/20/2017    HGB 12.4 02/22/2017    HCT 41.5 02/22/2017    .0 (H) 02/22/2017     02/22/2017     02/22/2017     02/21/2017     02/19/2017    K 3.5 02/22/2017    K 4.3 02/21/2017     K 4.2 02/19/2017     02/22/2017    ANIONGAP 7.6 02/22/2017       Lab Results   Component Value Date    INR 0.93 09/13/2016    INR <0.90 12/14/2015    INR 0.91 12/11/2015    PROTIME 10.5 09/13/2016    PROTIME 10.0 12/14/2015    PROTIME 10.3 12/11/2015             Assessment&#47;Plan    Active Problems:      1) Pneumonia-left lower lobe-symptoms worsening.  Escalating antibiotics February 22.  Poor prognosis for rapid recovery.  Continued care consult for poor 22nd.  Transfers accepted.  2) COPD exacerbation  3) acute on chronic respiratory failure with decreased saturations and respiratory distress from baseline.  4) early sepsis  5) lactic acidosis  6) history of paroxysmal atrial fibrillation  7) history of bipolar disorder  8) l chronic limited mobility secondary to respiratory distress  9) leukocytosis  10) DVT prophylaxis-subcutaneous Lovenox  11) stress gastritis prophylaxis-on Protonix orally.   See orders entered.     Mohamud Acosta MD  02/22/17  8:14 AM     Electronically signed by Mohamud Acosta MD at 2/22/2017  8:16 AM        Consult Notes (most recent note)     No notes of this type exist for this encounter.        Nutrition Notes (most recent note)     No notes of this type exist for this encounter.        Physical Therapy Notes (most recent note)     No notes of this type exist for this encounter.        Occupational Therapy Notes (most recent note)     No notes of this type exist for this encounter.        Speech Language Pathology Notes (most recent note)     No notes of this type exist for this encounter.           Respiratory Therapy Notes (most recent note)      Lizz Menjivar RRT at 2/17/2017 10:18 AM  Version 1 of 1         Skin barrier applied to cheeks due to v-mask straps and around mouth and over nose due to mask.  Skin in good condition.     Electronically signed by Lizz Menjivar RRT at 2/17/2017 10:19 AM

## 2017-02-22 NOTE — PROGRESS NOTES
Pharmacokinetics Service Note:    Ms. Aguilar has been evaluated for vancomycin dosing to treat her HCAP and COPD exacerbation.  Based on her estimated ClCr of around 49 ml/min and demographics, will load with 1 gm followed by 750 mg q18hrs to target troughs = 15-20 mg/L.  Will check a steady state trough level to determine if any adjustments are needed.    Thank you.  Mary Mcintosh, Pharm.D.  2/22/2017  8:39 AM

## 2017-02-22 NOTE — PLAN OF CARE
Problem: Fluid Volume Deficit (Adult)  Goal: Identify Related Risk Factors and Signs and Symptoms  Outcome: Ongoing (interventions implemented as appropriate)    02/20/17 1620   Fluid Volume Deficit   Fluid Volume Deficit: Related Risk Factors age extremes;functional decline;infection/sepsis   Signs and Symptoms (Fluid Volume Deficit) muscle weakness;skin turgor decreased       Goal: Fluid/Electrolyte Balance  Outcome: Ongoing (interventions implemented as appropriate)  Goal: Comfort/Well Being  Outcome: Ongoing (interventions implemented as appropriate)    Problem: Fall Risk (Adult)  Goal: Identify Related Risk Factors and Signs and Symptoms  Outcome: Ongoing (interventions implemented as appropriate)    02/21/17 1654 02/22/17 1533   Fall Risk   Fall Risk: Related Risk Factors age-related changes;depression/anxiety;fatigue/slow reaction --    Fall Risk: Signs and Symptoms --  presence of risk factors       Goal: Absence of Falls  Outcome: Ongoing (interventions implemented as appropriate)    Problem: Respiratory Insufficiency (Adult)  Goal: Identify Related Risk Factors and Signs and Symptoms  Outcome: Ongoing (interventions implemented as appropriate)    02/20/17 1620   Respiratory Insufficiency   Related Risk Factors (Respiratory Insufficiency) activity intolerance   Signs and Symptoms (Respiratory Insufficiency) abnormal breath sounds;shortness of breath;sleeplessness/fatigue       Goal: Acid/Base Balance  Outcome: Ongoing (interventions implemented as appropriate)  Goal: Effective Ventilation  Outcome: Ongoing (interventions implemented as appropriate)    Problem: Patient Care Overview (Adult)  Goal: Plan of Care Review  Outcome: Ongoing (interventions implemented as appropriate)    02/22/17 1533   Coping/Psychosocial Response Interventions   Plan Of Care Reviewed With patient   Patient Care Overview   Progress improving       Goal: Adult Individualization and Mutuality  Outcome: Ongoing (interventions  implemented as appropriate)  Goal: Discharge Needs Assessment  Outcome: Ongoing (interventions implemented as appropriate)    Problem: Pain, Acute (Adult)  Goal: Identify Related Risk Factors and Signs and Symptoms  Outcome: Ongoing (interventions implemented as appropriate)  Goal: Acceptable Pain Control/Comfort Level  Outcome: Ongoing (interventions implemented as appropriate)

## 2017-02-22 NOTE — PROGRESS NOTES
Discharge Planning Assessment   [place]     Patient Name: Shona Aguilar  MRN: [id number]  Today's Date: 2/22/2017    Admit Date: 2/17/2017       Discharge Plan       02/22/17 1129    Case Management/Social Work Plan    Plan CM nurse informed  that [facility] consult has been ordered. SS faxed information to [facility] [phone]. SS to follow.     14:55 SS received call from [facility] per Lilian who request information to be refaxed. SS refaxed information to [facility] [phone]. SS spoke to pt who is agreeable to be discharged to [facility] if accepted. SS attemtped contact with [facility] per Lilian and left message. SS to follow.     16:25 SS spoke to [facility] per Lilain who states pt will be admitted today. Lead nurse to fax information to [facility] [phone]. [facility]. No other needs identified.         Discharge Placement     Facility/Agency Request Status Selected? Address Phone Number Fax Number    [facility] Pending - No Request Sent     [address] [id number] [phone]    [facility] Pending - No Request Sent     [address] [id number] [phone]        Expected Discharge Date and Time     Expected Discharge Date Expected Discharge Time    Feb 24, 2017           Ciara Forbes

## 2017-02-23 PROCEDURE — 93005 ELECTROCARDIOGRAM TRACING: CPT | Performed by: INTERNAL MEDICINE

## 2017-02-23 PROCEDURE — 87086 URINE CULTURE/COLONY COUNT: CPT | Performed by: INTERNAL MEDICINE

## 2017-02-23 PROCEDURE — 97163 PT EVAL HIGH COMPLEX 45 MIN: CPT

## 2017-02-23 PROCEDURE — 97530 THERAPEUTIC ACTIVITIES: CPT

## 2017-02-24 ENCOUNTER — APPOINTMENT (OUTPATIENT)
Dept: GENERAL RADIOLOGY | Facility: HOSPITAL | Age: 72
End: 2017-02-24

## 2017-02-24 LAB
ALBUMIN SERPL-MCNC: 3.6 G/DL (ref 3.4–4.8)
ALBUMIN/GLOB SERPL: 1.5 G/DL (ref 1.5–2.5)
ALP SERPL-CCNC: 47 U/L (ref 35–104)
ALT SERPL W P-5'-P-CCNC: 16 U/L (ref 10–36)
ANION GAP SERPL CALCULATED.3IONS-SCNC: 2.7 MMOL/L (ref 3.6–11.2)
AST SERPL-CCNC: 17 U/L (ref 10–30)
BILIRUB SERPL-MCNC: 0.4 MG/DL (ref 0.2–1.8)
BNP SERPL-MCNC: 172 PG/ML (ref 0–100)
BUN BLD-MCNC: 16 MG/DL (ref 7–21)
BUN/CREAT SERPL: 43.2 (ref 7–25)
CALCIUM SPEC-SCNC: 8.8 MG/DL (ref 7.7–10)
CHLORIDE SERPL-SCNC: 107 MMOL/L (ref 99–112)
CK MB SERPL-CCNC: 2.99 NG/ML (ref 0–5)
CK MB SERPL-RTO: 6.8 % (ref 0–3)
CK SERPL-CCNC: 44 U/L (ref 24–173)
CO2 SERPL-SCNC: 34.3 MMOL/L (ref 24.3–31.9)
CREAT BLD-MCNC: 0.37 MG/DL (ref 0.43–1.29)
CRP SERPL-MCNC: 1.43 MG/DL (ref 0–0.99)
DEPRECATED RDW RBC AUTO: 47.3 FL (ref 37–54)
DIGOXIN SERPL-MCNC: 0.9 NG/ML (ref 0.8–2)
ERYTHROCYTE [DISTWIDTH] IN BLOOD BY AUTOMATED COUNT: 13.3 % (ref 11.5–14.5)
GFR SERPL CREATININE-BSD FRML MDRD: >150 ML/MIN/1.73
GLOBULIN UR ELPH-MCNC: 2.4 GM/DL
GLUCOSE BLD-MCNC: 153 MG/DL (ref 70–110)
HCT VFR BLD AUTO: 42.5 % (ref 37–47)
HGB BLD-MCNC: 12.9 G/DL (ref 12–16)
LITHIUM SERPL-SCNC: 0.3 MMOL/L (ref 0.6–1.2)
MAGNESIUM SERPL-MCNC: 2.2 MG/DL (ref 1.7–2.6)
MCH RBC QN AUTO: 30.3 PG (ref 27–33)
MCHC RBC AUTO-ENTMCNC: 30.4 G/DL (ref 33–37)
MCV RBC AUTO: 99.8 FL (ref 80–94)
MYOGLOBIN SERPL-MCNC: 13 NG/ML (ref 0–109)
OSMOLALITY SERPL CALC.SUM OF ELEC: 291.1 MOSM/KG (ref 273–305)
PHOSPHATE SERPL-MCNC: 2.4 MG/DL (ref 2.7–4.5)
PLATELET # BLD AUTO: 296 10*3/MM3 (ref 130–400)
PMV BLD AUTO: 9.8 FL (ref 6–10)
POTASSIUM BLD-SCNC: 3.5 MMOL/L (ref 3.5–5.3)
PROT SERPL-MCNC: 6 G/DL (ref 6–8)
RBC # BLD AUTO: 4.26 10*6/MM3 (ref 4.2–5.4)
SODIUM BLD-SCNC: 144 MMOL/L (ref 135–153)
TROPONIN I SERPL-MCNC: 0.02 NG/ML
TROPONIN I SERPL-MCNC: 0.03 NG/ML
TROPONIN I SERPL-MCNC: 0.04 NG/ML
WBC NRBC COR # BLD: 19.5 10*3/MM3 (ref 4.5–12.5)

## 2017-02-24 PROCEDURE — 83874 ASSAY OF MYOGLOBIN: CPT | Performed by: INTERNAL MEDICINE

## 2017-02-24 PROCEDURE — 80178 ASSAY OF LITHIUM: CPT | Performed by: INTERNAL MEDICINE

## 2017-02-24 PROCEDURE — 80053 COMPREHEN METABOLIC PANEL: CPT | Performed by: INTERNAL MEDICINE

## 2017-02-24 PROCEDURE — 85027 COMPLETE CBC AUTOMATED: CPT | Performed by: INTERNAL MEDICINE

## 2017-02-24 PROCEDURE — 93005 ELECTROCARDIOGRAM TRACING: CPT | Performed by: INTERNAL MEDICINE

## 2017-02-24 PROCEDURE — 71010 XR CHEST 1 VW: CPT | Performed by: RADIOLOGY

## 2017-02-24 PROCEDURE — 80162 ASSAY OF DIGOXIN TOTAL: CPT | Performed by: INTERNAL MEDICINE

## 2017-02-24 PROCEDURE — 82553 CREATINE MB FRACTION: CPT | Performed by: INTERNAL MEDICINE

## 2017-02-24 PROCEDURE — 82550 ASSAY OF CK (CPK): CPT | Performed by: INTERNAL MEDICINE

## 2017-02-24 PROCEDURE — 84484 ASSAY OF TROPONIN QUANT: CPT | Performed by: INTERNAL MEDICINE

## 2017-02-24 PROCEDURE — 97530 THERAPEUTIC ACTIVITIES: CPT

## 2017-02-24 PROCEDURE — 84100 ASSAY OF PHOSPHORUS: CPT | Performed by: INTERNAL MEDICINE

## 2017-02-24 PROCEDURE — 86140 C-REACTIVE PROTEIN: CPT | Performed by: INTERNAL MEDICINE

## 2017-02-24 PROCEDURE — 83880 ASSAY OF NATRIURETIC PEPTIDE: CPT | Performed by: INTERNAL MEDICINE

## 2017-02-24 PROCEDURE — 83735 ASSAY OF MAGNESIUM: CPT | Performed by: INTERNAL MEDICINE

## 2017-02-24 PROCEDURE — 97110 THERAPEUTIC EXERCISES: CPT

## 2017-02-24 PROCEDURE — 71010 HC CHEST PA OR AP: CPT

## 2017-02-25 LAB
ALBUMIN SERPL-MCNC: 3.6 G/DL (ref 3.4–4.8)
ALBUMIN/GLOB SERPL: 1.5 G/DL (ref 1.5–2.5)
ALP SERPL-CCNC: 41 U/L (ref 35–104)
ALT SERPL W P-5'-P-CCNC: 12 U/L (ref 10–36)
ANION GAP SERPL CALCULATED.3IONS-SCNC: 6.2 MMOL/L (ref 3.6–11.2)
AST SERPL-CCNC: 17 U/L (ref 10–30)
BACTERIA SPEC AEROBE CULT: NO GROWTH
BILIRUB SERPL-MCNC: 0.3 MG/DL (ref 0.2–1.8)
BNP SERPL-MCNC: 221 PG/ML (ref 0–100)
BUN BLD-MCNC: 16 MG/DL (ref 7–21)
BUN/CREAT SERPL: 44.4 (ref 7–25)
CALCIUM SPEC-SCNC: 8.9 MG/DL (ref 7.7–10)
CHLORIDE SERPL-SCNC: 106 MMOL/L (ref 99–112)
CO2 SERPL-SCNC: 37.8 MMOL/L (ref 24.3–31.9)
CREAT BLD-MCNC: 0.36 MG/DL (ref 0.43–1.29)
CRP SERPL-MCNC: 6.02 MG/DL (ref 0–0.99)
DEPRECATED RDW RBC AUTO: 48.1 FL (ref 37–54)
ERYTHROCYTE [DISTWIDTH] IN BLOOD BY AUTOMATED COUNT: 13.4 % (ref 11.5–14.5)
GFR SERPL CREATININE-BSD FRML MDRD: >150 ML/MIN/1.73
GLOBULIN UR ELPH-MCNC: 2.4 GM/DL
GLUCOSE BLD-MCNC: 95 MG/DL (ref 70–110)
HCT VFR BLD AUTO: 39.6 % (ref 37–47)
HGB BLD-MCNC: 11.9 G/DL (ref 12–16)
MCH RBC QN AUTO: 30.1 PG (ref 27–33)
MCHC RBC AUTO-ENTMCNC: 30.1 G/DL (ref 33–37)
MCV RBC AUTO: 100.3 FL (ref 80–94)
OSMOLALITY SERPL CALC.SUM OF ELEC: 299 MOSM/KG (ref 273–305)
PLATELET # BLD AUTO: 300 10*3/MM3 (ref 130–400)
PMV BLD AUTO: 9.9 FL (ref 6–10)
POTASSIUM BLD-SCNC: 3.8 MMOL/L (ref 3.5–5.3)
PROT SERPL-MCNC: 6 G/DL (ref 6–8)
RBC # BLD AUTO: 3.95 10*6/MM3 (ref 4.2–5.4)
SODIUM BLD-SCNC: 150 MMOL/L (ref 135–153)
VANCOMYCIN TROUGH SERPL-MCNC: 5.9 MCG/ML (ref 5–15)
WBC NRBC COR # BLD: 16.42 10*3/MM3 (ref 4.5–12.5)

## 2017-02-25 PROCEDURE — 86140 C-REACTIVE PROTEIN: CPT | Performed by: INTERNAL MEDICINE

## 2017-02-25 PROCEDURE — 80202 ASSAY OF VANCOMYCIN: CPT | Performed by: INTERNAL MEDICINE

## 2017-02-25 PROCEDURE — 80053 COMPREHEN METABOLIC PANEL: CPT | Performed by: INTERNAL MEDICINE

## 2017-02-25 PROCEDURE — 83880 ASSAY OF NATRIURETIC PEPTIDE: CPT | Performed by: INTERNAL MEDICINE

## 2017-02-25 PROCEDURE — 85027 COMPLETE CBC AUTOMATED: CPT | Performed by: INTERNAL MEDICINE

## 2017-02-27 LAB — VANCOMYCIN TROUGH SERPL-MCNC: 6.8 MCG/ML (ref 5–15)

## 2017-02-27 PROCEDURE — 80202 ASSAY OF VANCOMYCIN: CPT | Performed by: INTERNAL MEDICINE

## 2017-02-28 ENCOUNTER — APPOINTMENT (OUTPATIENT)
Dept: GENERAL RADIOLOGY | Facility: HOSPITAL | Age: 72
End: 2017-02-28

## 2017-02-28 LAB
ALBUMIN SERPL-MCNC: 3.1 G/DL (ref 3.4–4.8)
ALBUMIN/GLOB SERPL: 1.4 G/DL (ref 1.5–2.5)
ALP SERPL-CCNC: 65 U/L (ref 35–104)
ALT SERPL W P-5'-P-CCNC: 21 U/L (ref 10–36)
ANION GAP SERPL CALCULATED.3IONS-SCNC: 1.7 MMOL/L (ref 3.6–11.2)
AST SERPL-CCNC: 24 U/L (ref 10–30)
BILIRUB SERPL-MCNC: 0.2 MG/DL (ref 0.2–1.8)
BUN BLD-MCNC: 14 MG/DL (ref 7–21)
BUN/CREAT SERPL: 56 (ref 7–25)
CALCIUM SPEC-SCNC: 8.8 MG/DL (ref 7.7–10)
CHLORIDE SERPL-SCNC: 107 MMOL/L (ref 99–112)
CO2 SERPL-SCNC: 35.3 MMOL/L (ref 24.3–31.9)
CREAT BLD-MCNC: 0.25 MG/DL (ref 0.43–1.29)
CRP SERPL-MCNC: 5.54 MG/DL (ref 0–0.99)
DEPRECATED RDW RBC AUTO: 49.4 FL (ref 37–54)
ERYTHROCYTE [DISTWIDTH] IN BLOOD BY AUTOMATED COUNT: 13.4 % (ref 11.5–14.5)
GFR SERPL CREATININE-BSD FRML MDRD: >150 ML/MIN/1.73
GLOBULIN UR ELPH-MCNC: 2.2 GM/DL
GLUCOSE BLD-MCNC: 87 MG/DL (ref 70–110)
HCT VFR BLD AUTO: 34.7 % (ref 37–47)
HGB BLD-MCNC: 9.9 G/DL (ref 12–16)
MCH RBC QN AUTO: 29.3 PG (ref 27–33)
MCHC RBC AUTO-ENTMCNC: 28.5 G/DL (ref 33–37)
MCV RBC AUTO: 102.7 FL (ref 80–94)
OSMOLALITY SERPL CALC.SUM OF ELEC: 286.7 MOSM/KG (ref 273–305)
PLATELET # BLD AUTO: 256 10*3/MM3 (ref 130–400)
PMV BLD AUTO: 10.2 FL (ref 6–10)
POTASSIUM BLD-SCNC: 3.9 MMOL/L (ref 3.5–5.3)
PROT SERPL-MCNC: 5.3 G/DL (ref 6–8)
RBC # BLD AUTO: 3.38 10*6/MM3 (ref 4.2–5.4)
SODIUM BLD-SCNC: 144 MMOL/L (ref 135–153)
VANCOMYCIN TROUGH SERPL-MCNC: 10 MCG/ML (ref 5–15)
WBC NRBC COR # BLD: 12.1 10*3/MM3 (ref 4.5–12.5)

## 2017-02-28 PROCEDURE — 86140 C-REACTIVE PROTEIN: CPT | Performed by: INTERNAL MEDICINE

## 2017-02-28 PROCEDURE — 85027 COMPLETE CBC AUTOMATED: CPT | Performed by: INTERNAL MEDICINE

## 2017-02-28 PROCEDURE — 93005 ELECTROCARDIOGRAM TRACING: CPT | Performed by: INTERNAL MEDICINE

## 2017-02-28 PROCEDURE — 71010 XR CHEST 1 VW: CPT | Performed by: RADIOLOGY

## 2017-02-28 PROCEDURE — 93010 ELECTROCARDIOGRAM REPORT: CPT | Performed by: INTERNAL MEDICINE

## 2017-02-28 PROCEDURE — 80053 COMPREHEN METABOLIC PANEL: CPT | Performed by: INTERNAL MEDICINE

## 2017-02-28 PROCEDURE — 71010 HC CHEST PA OR AP: CPT

## 2017-02-28 PROCEDURE — 80202 ASSAY OF VANCOMYCIN: CPT | Performed by: INTERNAL MEDICINE

## 2017-03-01 ENCOUNTER — APPOINTMENT (OUTPATIENT)
Dept: CARDIOLOGY | Facility: HOSPITAL | Age: 72
End: 2017-03-01
Attending: INTERNAL MEDICINE

## 2017-03-01 LAB
ALBUMIN SERPL-MCNC: 3.3 G/DL (ref 3.4–4.8)
ALBUMIN/GLOB SERPL: 1.6 G/DL (ref 1.5–2.5)
ALP SERPL-CCNC: 60 U/L (ref 35–104)
ALT SERPL W P-5'-P-CCNC: 23 U/L (ref 10–36)
ANION GAP SERPL CALCULATED.3IONS-SCNC: 2.5 MMOL/L (ref 3.6–11.2)
AST SERPL-CCNC: 22 U/L (ref 10–30)
BASOPHILS # BLD AUTO: 0.04 10*3/MM3 (ref 0–0.3)
BASOPHILS NFR BLD AUTO: 0.3 % (ref 0–2)
BH CV ECHO MEAS - ACS: 1.3 CM
BH CV ECHO MEAS - AO ROOT AREA (BSA CORRECTED): 2.1
BH CV ECHO MEAS - AO ROOT AREA: 7.2 CM^2
BH CV ECHO MEAS - AO ROOT DIAM: 3 CM
BH CV ECHO MEAS - BSA(HAYCOCK): 1.4 M^2
BH CV ECHO MEAS - BSA: 1.5 M^2
BH CV ECHO MEAS - BZI_BMI: 19.2 KILOGRAMS/M^2
BH CV ECHO MEAS - BZI_METRIC_HEIGHT: 157.5 CM
BH CV ECHO MEAS - BZI_METRIC_WEIGHT: 47.6 KG
BH CV ECHO MEAS - EDV(CUBED): 140.8 ML
BH CV ECHO MEAS - EDV(TEICH): 129.7 ML
BH CV ECHO MEAS - EF(CUBED): 68.3 %
BH CV ECHO MEAS - EF(TEICH): 59.4 %
BH CV ECHO MEAS - ESV(CUBED): 44.7 ML
BH CV ECHO MEAS - ESV(TEICH): 52.6 ML
BH CV ECHO MEAS - FS: 31.8 %
BH CV ECHO MEAS - IVS/LVPW: 1.1
BH CV ECHO MEAS - IVSD: 0.91 CM
BH CV ECHO MEAS - LA DIMENSION: 2.3 CM
BH CV ECHO MEAS - LA/AO: 0.77
BH CV ECHO MEAS - LV MASS(C)D: 159.4 GRAMS
BH CV ECHO MEAS - LV MASS(C)DI: 109.7 GRAMS/M^2
BH CV ECHO MEAS - LVIDD: 5.2 CM
BH CV ECHO MEAS - LVIDS: 3.5 CM
BH CV ECHO MEAS - LVOT AREA (M): 2.5 CM^2
BH CV ECHO MEAS - LVOT AREA: 2.7 CM^2
BH CV ECHO MEAS - LVOT DIAM: 1.8 CM
BH CV ECHO MEAS - LVPWD: 0.81 CM
BH CV ECHO MEAS - PA ACC SLOPE: 846.7 CM/SEC^2
BH CV ECHO MEAS - PA ACC TIME: 0.09 SEC
BH CV ECHO MEAS - PA PR(ACCEL): 39.4 MMHG
BH CV ECHO MEAS - RAP SYSTOLE: 10 MMHG
BH CV ECHO MEAS - RVSP: 89.3 MMHG
BH CV ECHO MEAS - SI(CUBED): 66.1 ML/M^2
BH CV ECHO MEAS - SI(TEICH): 53 ML/M^2
BH CV ECHO MEAS - SV(CUBED): 96.1 ML
BH CV ECHO MEAS - SV(TEICH): 77.1 ML
BH CV ECHO MEAS - TR MAX VEL: 445.2 CM/SEC
BILIRUB SERPL-MCNC: 0.2 MG/DL (ref 0.2–1.8)
BNP SERPL-MCNC: 256 PG/ML (ref 0–100)
BUN BLD-MCNC: 18 MG/DL (ref 7–21)
BUN/CREAT SERPL: 46.2 (ref 7–25)
CALCIUM SPEC-SCNC: 9.1 MG/DL (ref 7.7–10)
CHLORIDE SERPL-SCNC: 104 MMOL/L (ref 99–112)
CO2 SERPL-SCNC: 38.5 MMOL/L (ref 24.3–31.9)
CREAT BLD-MCNC: 0.39 MG/DL (ref 0.43–1.29)
CRP SERPL-MCNC: 4.85 MG/DL (ref 0–0.99)
DEPRECATED RDW RBC AUTO: 46.4 FL (ref 37–54)
EOSINOPHIL # BLD AUTO: 0.4 10*3/MM3 (ref 0–0.7)
EOSINOPHIL NFR BLD AUTO: 3.1 % (ref 0–7)
ERYTHROCYTE [DISTWIDTH] IN BLOOD BY AUTOMATED COUNT: 13.3 % (ref 11.5–14.5)
GFR SERPL CREATININE-BSD FRML MDRD: >150 ML/MIN/1.73
GLOBULIN UR ELPH-MCNC: 2.1 GM/DL
GLUCOSE BLD-MCNC: 92 MG/DL (ref 70–110)
HCT VFR BLD AUTO: 33.6 % (ref 37–47)
HGB BLD-MCNC: 9.9 G/DL (ref 12–16)
IMM GRANULOCYTES # BLD: 0.05 10*3/MM3 (ref 0–0.03)
IMM GRANULOCYTES NFR BLD: 0.4 % (ref 0–0.5)
LYMPHOCYTES # BLD AUTO: 2.1 10*3/MM3 (ref 1–3)
LYMPHOCYTES NFR BLD AUTO: 16.4 % (ref 16–46)
MCH RBC QN AUTO: 29.5 PG (ref 27–33)
MCHC RBC AUTO-ENTMCNC: 29.5 G/DL (ref 33–37)
MCV RBC AUTO: 100 FL (ref 80–94)
MONOCYTES # BLD AUTO: 0.9 10*3/MM3 (ref 0.1–0.9)
MONOCYTES NFR BLD AUTO: 7 % (ref 0–12)
NEUTROPHILS # BLD AUTO: 9.3 10*3/MM3 (ref 1.4–6.5)
NEUTROPHILS NFR BLD AUTO: 72.8 % (ref 40–75)
OSMOLALITY SERPL CALC.SUM OF ELEC: 290.2 MOSM/KG (ref 273–305)
PLATELET # BLD AUTO: 288 10*3/MM3 (ref 130–400)
PMV BLD AUTO: 10.2 FL (ref 6–10)
POTASSIUM BLD-SCNC: 3.7 MMOL/L (ref 3.5–5.3)
PROT SERPL-MCNC: 5.4 G/DL (ref 6–8)
RBC # BLD AUTO: 3.36 10*6/MM3 (ref 4.2–5.4)
SODIUM BLD-SCNC: 145 MMOL/L (ref 135–153)
TSH SERPL DL<=0.05 MIU/L-ACNC: 2.24 MIU/ML (ref 0.55–4.78)
WBC NRBC COR # BLD: 12.79 10*3/MM3 (ref 4.5–12.5)

## 2017-03-01 PROCEDURE — 86140 C-REACTIVE PROTEIN: CPT | Performed by: INTERNAL MEDICINE

## 2017-03-01 PROCEDURE — 80053 COMPREHEN METABOLIC PANEL: CPT | Performed by: INTERNAL MEDICINE

## 2017-03-01 PROCEDURE — 85025 COMPLETE CBC W/AUTO DIFF WBC: CPT | Performed by: INTERNAL MEDICINE

## 2017-03-01 PROCEDURE — 84443 ASSAY THYROID STIM HORMONE: CPT | Performed by: INTERNAL MEDICINE

## 2017-03-01 PROCEDURE — 83880 ASSAY OF NATRIURETIC PEPTIDE: CPT | Performed by: INTERNAL MEDICINE

## 2017-03-01 PROCEDURE — 93306 TTE W/DOPPLER COMPLETE: CPT

## 2017-03-02 LAB
ALBUMIN SERPL-MCNC: 3.6 G/DL (ref 3.4–4.8)
ALBUMIN/GLOB SERPL: 1.4 G/DL (ref 1.5–2.5)
ALP SERPL-CCNC: 64 U/L (ref 35–104)
ALT SERPL W P-5'-P-CCNC: 21 U/L (ref 10–36)
ANION GAP SERPL CALCULATED.3IONS-SCNC: 5.6 MMOL/L (ref 3.6–11.2)
AST SERPL-CCNC: 23 U/L (ref 10–30)
BASOPHILS # BLD AUTO: 0.06 10*3/MM3 (ref 0–0.3)
BASOPHILS NFR BLD AUTO: 0.5 % (ref 0–2)
BILIRUB SERPL-MCNC: 0.3 MG/DL (ref 0.2–1.8)
BNP SERPL-MCNC: 765 PG/ML (ref 0–100)
BUN BLD-MCNC: 17 MG/DL (ref 7–21)
BUN/CREAT SERPL: 50 (ref 7–25)
CALCIUM SPEC-SCNC: 9.4 MG/DL (ref 7.7–10)
CHLORIDE SERPL-SCNC: 107 MMOL/L (ref 99–112)
CO2 SERPL-SCNC: 31.4 MMOL/L (ref 24.3–31.9)
CREAT BLD-MCNC: 0.34 MG/DL (ref 0.43–1.29)
CRP SERPL-MCNC: 5.62 MG/DL (ref 0–0.99)
DEPRECATED RDW RBC AUTO: 46.9 FL (ref 37–54)
EOSINOPHIL # BLD AUTO: 0.29 10*3/MM3 (ref 0–0.7)
EOSINOPHIL NFR BLD AUTO: 2.6 % (ref 0–7)
ERYTHROCYTE [DISTWIDTH] IN BLOOD BY AUTOMATED COUNT: 13.4 % (ref 11.5–14.5)
GFR SERPL CREATININE-BSD FRML MDRD: >150 ML/MIN/1.73
GLOBULIN UR ELPH-MCNC: 2.6 GM/DL
GLUCOSE BLD-MCNC: 88 MG/DL (ref 70–110)
HCT VFR BLD AUTO: 37.1 % (ref 37–47)
HGB BLD-MCNC: 10.9 G/DL (ref 12–16)
IMM GRANULOCYTES # BLD: 0.04 10*3/MM3 (ref 0–0.03)
IMM GRANULOCYTES NFR BLD: 0.4 % (ref 0–0.5)
LYMPHOCYTES # BLD AUTO: 1.97 10*3/MM3 (ref 1–3)
LYMPHOCYTES NFR BLD AUTO: 17.3 % (ref 16–46)
MCH RBC QN AUTO: 29.2 PG (ref 27–33)
MCHC RBC AUTO-ENTMCNC: 29.4 G/DL (ref 33–37)
MCV RBC AUTO: 99.5 FL (ref 80–94)
MONOCYTES # BLD AUTO: 0.85 10*3/MM3 (ref 0.1–0.9)
MONOCYTES NFR BLD AUTO: 7.5 % (ref 0–12)
NEUTROPHILS # BLD AUTO: 8.15 10*3/MM3 (ref 1.4–6.5)
NEUTROPHILS NFR BLD AUTO: 71.7 % (ref 40–75)
OSMOLALITY SERPL CALC.SUM OF ELEC: 287.8 MOSM/KG (ref 273–305)
PLATELET # BLD AUTO: 329 10*3/MM3 (ref 130–400)
PMV BLD AUTO: 10.4 FL (ref 6–10)
POTASSIUM BLD-SCNC: 3.7 MMOL/L (ref 3.5–5.3)
PROT SERPL-MCNC: 6.2 G/DL (ref 6–8)
RBC # BLD AUTO: 3.73 10*6/MM3 (ref 4.2–5.4)
SODIUM BLD-SCNC: 144 MMOL/L (ref 135–153)
VANCOMYCIN TROUGH SERPL-MCNC: 13.7 MCG/ML (ref 5–15)
WBC NRBC COR # BLD: 11.36 10*3/MM3 (ref 4.5–12.5)

## 2017-03-02 PROCEDURE — 83880 ASSAY OF NATRIURETIC PEPTIDE: CPT | Performed by: INTERNAL MEDICINE

## 2017-03-02 PROCEDURE — 80053 COMPREHEN METABOLIC PANEL: CPT | Performed by: INTERNAL MEDICINE

## 2017-03-02 PROCEDURE — 86140 C-REACTIVE PROTEIN: CPT | Performed by: INTERNAL MEDICINE

## 2017-03-02 PROCEDURE — 85025 COMPLETE CBC W/AUTO DIFF WBC: CPT | Performed by: INTERNAL MEDICINE

## 2017-03-02 PROCEDURE — 80202 ASSAY OF VANCOMYCIN: CPT | Performed by: INTERNAL MEDICINE

## 2017-03-03 ENCOUNTER — APPOINTMENT (OUTPATIENT)
Dept: GENERAL RADIOLOGY | Facility: HOSPITAL | Age: 72
End: 2017-03-03

## 2017-03-03 LAB
CK MB SERPL-CCNC: 4.21 NG/ML (ref 0–5)
CK MB SERPL-RTO: 7.8 % (ref 0–3)
CK SERPL-CCNC: 54 U/L (ref 24–173)
MYOGLOBIN SERPL-MCNC: 37 NG/ML (ref 0–109)
TROPONIN I SERPL-MCNC: 0.02 NG/ML

## 2017-03-03 PROCEDURE — 82550 ASSAY OF CK (CPK): CPT | Performed by: INTERNAL MEDICINE

## 2017-03-03 PROCEDURE — 71010 XR CHEST 1 VW: CPT | Performed by: RADIOLOGY

## 2017-03-03 PROCEDURE — 84484 ASSAY OF TROPONIN QUANT: CPT | Performed by: INTERNAL MEDICINE

## 2017-03-03 PROCEDURE — 93010 ELECTROCARDIOGRAM REPORT: CPT | Performed by: INTERNAL MEDICINE

## 2017-03-03 PROCEDURE — 93005 ELECTROCARDIOGRAM TRACING: CPT | Performed by: INTERNAL MEDICINE

## 2017-03-03 PROCEDURE — 82553 CREATINE MB FRACTION: CPT | Performed by: INTERNAL MEDICINE

## 2017-03-03 PROCEDURE — 71010 HC CHEST PA OR AP: CPT

## 2017-03-03 PROCEDURE — 83874 ASSAY OF MYOGLOBIN: CPT | Performed by: INTERNAL MEDICINE

## 2017-03-03 NOTE — CONSULTS
Multiple prior unsuccessful attempts have been made to place midline and picc line in pt. She does not meet critera for picc line insertion due to vein size. Multiple prior midline placements were unsuccessful. The veins were accessed but due to the small size the catheters would not thread. Recommend consult for central line placement. Nurse is aware.

## 2017-03-04 ENCOUNTER — OUTSIDE FACILITY SERVICE (OUTPATIENT)
Dept: CARDIOLOGY | Facility: CLINIC | Age: 72
End: 2017-03-04

## 2017-03-04 LAB
ANION GAP SERPL CALCULATED.3IONS-SCNC: 1.4 MMOL/L (ref 3.6–11.2)
BNP SERPL-MCNC: 451 PG/ML (ref 0–100)
BUN BLD-MCNC: 14 MG/DL (ref 7–21)
BUN/CREAT SERPL: 56 (ref 7–25)
CALCIUM SPEC-SCNC: 8.8 MG/DL (ref 7.7–10)
CHLORIDE SERPL-SCNC: 108 MMOL/L (ref 99–112)
CO2 SERPL-SCNC: 34.6 MMOL/L (ref 24.3–31.9)
CREAT BLD-MCNC: 0.25 MG/DL (ref 0.43–1.29)
DEPRECATED RDW RBC AUTO: 46.7 FL (ref 37–54)
ERYTHROCYTE [DISTWIDTH] IN BLOOD BY AUTOMATED COUNT: 13.5 % (ref 11.5–14.5)
GFR SERPL CREATININE-BSD FRML MDRD: >150 ML/MIN/1.73
GLUCOSE BLD-MCNC: 86 MG/DL (ref 70–110)
HCT VFR BLD AUTO: 34.3 % (ref 37–47)
HGB BLD-MCNC: 10.3 G/DL (ref 12–16)
MCH RBC QN AUTO: 29.9 PG (ref 27–33)
MCHC RBC AUTO-ENTMCNC: 30 G/DL (ref 33–37)
MCV RBC AUTO: 99.4 FL (ref 80–94)
OSMOLALITY SERPL CALC.SUM OF ELEC: 286.6 MOSM/KG (ref 273–305)
PLATELET # BLD AUTO: 348 10*3/MM3 (ref 130–400)
PMV BLD AUTO: 10 FL (ref 6–10)
POTASSIUM BLD-SCNC: 3.1 MMOL/L (ref 3.5–5.3)
POTASSIUM BLD-SCNC: 3.4 MMOL/L (ref 3.5–5.3)
POTASSIUM BLD-SCNC: 3.7 MMOL/L (ref 3.5–5.3)
RBC # BLD AUTO: 3.45 10*6/MM3 (ref 4.2–5.4)
SODIUM BLD-SCNC: 144 MMOL/L (ref 135–153)
WBC NRBC COR # BLD: 11.62 10*3/MM3 (ref 4.5–12.5)

## 2017-03-04 PROCEDURE — 84132 ASSAY OF SERUM POTASSIUM: CPT | Performed by: INTERNAL MEDICINE

## 2017-03-04 PROCEDURE — 85027 COMPLETE CBC AUTOMATED: CPT | Performed by: INTERNAL MEDICINE

## 2017-03-04 PROCEDURE — 99232 SBSQ HOSP IP/OBS MODERATE 35: CPT | Performed by: INTERNAL MEDICINE

## 2017-03-04 PROCEDURE — 93005 ELECTROCARDIOGRAM TRACING: CPT | Performed by: INTERNAL MEDICINE

## 2017-03-04 PROCEDURE — 83880 ASSAY OF NATRIURETIC PEPTIDE: CPT | Performed by: INTERNAL MEDICINE

## 2017-03-04 PROCEDURE — 93010 ELECTROCARDIOGRAM REPORT: CPT | Performed by: INTERNAL MEDICINE

## 2017-03-04 PROCEDURE — 80048 BASIC METABOLIC PNL TOTAL CA: CPT | Performed by: INTERNAL MEDICINE

## 2017-03-05 LAB
ALBUMIN SERPL-MCNC: 3.2 G/DL (ref 3.4–4.8)
ALBUMIN/GLOB SERPL: 1.5 G/DL (ref 1.5–2.5)
ALP SERPL-CCNC: 46 U/L (ref 35–104)
ALT SERPL W P-5'-P-CCNC: 12 U/L (ref 10–36)
ANION GAP SERPL CALCULATED.3IONS-SCNC: 0.6 MMOL/L (ref 3.6–11.2)
AST SERPL-CCNC: 21 U/L (ref 10–30)
BILIRUB SERPL-MCNC: 0.2 MG/DL (ref 0.2–1.8)
BUN BLD-MCNC: 13 MG/DL (ref 7–21)
BUN/CREAT SERPL: 48.1 (ref 7–25)
CALCIUM SPEC-SCNC: 8.8 MG/DL (ref 7.7–10)
CHLORIDE SERPL-SCNC: 108 MMOL/L (ref 99–112)
CO2 SERPL-SCNC: 35.4 MMOL/L (ref 24.3–31.9)
CREAT BLD-MCNC: 0.27 MG/DL (ref 0.43–1.29)
CRP SERPL-MCNC: 2.9 MG/DL (ref 0–0.99)
DEPRECATED RDW RBC AUTO: 47.5 FL (ref 37–54)
ERYTHROCYTE [DISTWIDTH] IN BLOOD BY AUTOMATED COUNT: 13.6 % (ref 11.5–14.5)
GFR SERPL CREATININE-BSD FRML MDRD: >150 ML/MIN/1.73
GLOBULIN UR ELPH-MCNC: 2.1 GM/DL
GLUCOSE BLD-MCNC: 141 MG/DL (ref 70–110)
HCT VFR BLD AUTO: 33.8 % (ref 37–47)
HGB BLD-MCNC: 9.9 G/DL (ref 12–16)
MAGNESIUM SERPL-MCNC: 2.1 MG/DL (ref 1.7–2.6)
MCH RBC QN AUTO: 29.2 PG (ref 27–33)
MCHC RBC AUTO-ENTMCNC: 29.3 G/DL (ref 33–37)
MCV RBC AUTO: 99.7 FL (ref 80–94)
OSMOLALITY SERPL CALC.SUM OF ELEC: 289.3 MOSM/KG (ref 273–305)
PHOSPHATE SERPL-MCNC: 2.7 MG/DL (ref 2.7–4.5)
PLATELET # BLD AUTO: 318 10*3/MM3 (ref 130–400)
PMV BLD AUTO: 10.6 FL (ref 6–10)
POTASSIUM BLD-SCNC: 3.6 MMOL/L (ref 3.5–5.3)
PROT SERPL-MCNC: 5.3 G/DL (ref 6–8)
RBC # BLD AUTO: 3.39 10*6/MM3 (ref 4.2–5.4)
SODIUM BLD-SCNC: 144 MMOL/L (ref 135–153)
WBC NRBC COR # BLD: 11.59 10*3/MM3 (ref 4.5–12.5)

## 2017-03-05 PROCEDURE — 93010 ELECTROCARDIOGRAM REPORT: CPT | Performed by: INTERNAL MEDICINE

## 2017-03-05 PROCEDURE — 83735 ASSAY OF MAGNESIUM: CPT | Performed by: INTERNAL MEDICINE

## 2017-03-05 PROCEDURE — 84100 ASSAY OF PHOSPHORUS: CPT | Performed by: INTERNAL MEDICINE

## 2017-03-05 PROCEDURE — 85027 COMPLETE CBC AUTOMATED: CPT | Performed by: INTERNAL MEDICINE

## 2017-03-05 PROCEDURE — 80053 COMPREHEN METABOLIC PANEL: CPT | Performed by: INTERNAL MEDICINE

## 2017-03-05 PROCEDURE — 86140 C-REACTIVE PROTEIN: CPT | Performed by: INTERNAL MEDICINE

## 2017-03-05 PROCEDURE — 93005 ELECTROCARDIOGRAM TRACING: CPT | Performed by: INTERNAL MEDICINE

## 2017-03-06 PROCEDURE — 97163 PT EVAL HIGH COMPLEX 45 MIN: CPT

## 2017-03-06 PROCEDURE — 97530 THERAPEUTIC ACTIVITIES: CPT

## 2017-03-07 LAB
CRP SERPL-MCNC: 1.7 MG/DL (ref 0–0.99)
VANCOMYCIN TROUGH SERPL-MCNC: 18.1 MCG/ML (ref 5–15)

## 2017-03-07 PROCEDURE — 86140 C-REACTIVE PROTEIN: CPT | Performed by: INTERNAL MEDICINE

## 2017-03-07 PROCEDURE — 80202 ASSAY OF VANCOMYCIN: CPT | Performed by: INTERNAL MEDICINE

## 2017-03-08 LAB
ALBUMIN SERPL-MCNC: 3.1 G/DL (ref 3.4–4.8)
ALBUMIN/GLOB SERPL: 1.4 G/DL (ref 1.5–2.5)
ALP SERPL-CCNC: 42 U/L (ref 35–104)
ALT SERPL W P-5'-P-CCNC: 9 U/L (ref 10–36)
ANION GAP SERPL CALCULATED.3IONS-SCNC: 0 MMOL/L (ref 3.6–11.2)
AST SERPL-CCNC: 19 U/L (ref 10–30)
BILIRUB SERPL-MCNC: 0.1 MG/DL (ref 0.2–1.8)
BNP SERPL-MCNC: 824 PG/ML (ref 0–100)
BUN BLD-MCNC: 15 MG/DL (ref 7–21)
BUN/CREAT SERPL: 46.9 (ref 7–25)
CALCIUM SPEC-SCNC: 8.8 MG/DL (ref 7.7–10)
CHLORIDE SERPL-SCNC: 111 MMOL/L (ref 99–112)
CO2 SERPL-SCNC: 35 MMOL/L (ref 24.3–31.9)
CREAT BLD-MCNC: 0.32 MG/DL (ref 0.43–1.29)
DEPRECATED RDW RBC AUTO: 47.9 FL (ref 37–54)
ERYTHROCYTE [DISTWIDTH] IN BLOOD BY AUTOMATED COUNT: 13.4 % (ref 11.5–14.5)
GFR SERPL CREATININE-BSD FRML MDRD: >150 ML/MIN/1.73
GLOBULIN UR ELPH-MCNC: 2.2 GM/DL
GLUCOSE BLD-MCNC: 103 MG/DL (ref 70–110)
HCT VFR BLD AUTO: 34.2 % (ref 37–47)
HGB BLD-MCNC: 9.9 G/DL (ref 12–16)
LITHIUM SERPL-SCNC: 0.4 MMOL/L (ref 0.6–1.2)
MAGNESIUM SERPL-MCNC: 2.3 MG/DL (ref 1.7–2.6)
MCH RBC QN AUTO: 29.2 PG (ref 27–33)
MCHC RBC AUTO-ENTMCNC: 28.9 G/DL (ref 33–37)
MCV RBC AUTO: 100.9 FL (ref 80–94)
OSMOLALITY SERPL CALC.SUM OF ELEC: 291.6 MOSM/KG (ref 273–305)
PHOSPHATE SERPL-MCNC: 3.1 MG/DL (ref 2.7–4.5)
PLATELET # BLD AUTO: 284 10*3/MM3 (ref 130–400)
PMV BLD AUTO: 10.7 FL (ref 6–10)
POTASSIUM BLD-SCNC: 3.5 MMOL/L (ref 3.5–5.3)
PROT SERPL-MCNC: 5.3 G/DL (ref 6–8)
RBC # BLD AUTO: 3.39 10*6/MM3 (ref 4.2–5.4)
SODIUM BLD-SCNC: 146 MMOL/L (ref 135–153)
WBC NRBC COR # BLD: 8.57 10*3/MM3 (ref 4.5–12.5)

## 2017-03-08 PROCEDURE — 80053 COMPREHEN METABOLIC PANEL: CPT | Performed by: INTERNAL MEDICINE

## 2017-03-08 PROCEDURE — 85027 COMPLETE CBC AUTOMATED: CPT | Performed by: INTERNAL MEDICINE

## 2017-03-08 PROCEDURE — 83880 ASSAY OF NATRIURETIC PEPTIDE: CPT | Performed by: INTERNAL MEDICINE

## 2017-03-08 PROCEDURE — 97110 THERAPEUTIC EXERCISES: CPT

## 2017-03-08 PROCEDURE — 83735 ASSAY OF MAGNESIUM: CPT | Performed by: INTERNAL MEDICINE

## 2017-03-08 PROCEDURE — 97530 THERAPEUTIC ACTIVITIES: CPT

## 2017-03-08 PROCEDURE — 84100 ASSAY OF PHOSPHORUS: CPT | Performed by: INTERNAL MEDICINE

## 2017-03-08 PROCEDURE — 80178 ASSAY OF LITHIUM: CPT | Performed by: INTERNAL MEDICINE

## 2017-03-10 ENCOUNTER — APPOINTMENT (OUTPATIENT)
Dept: GENERAL RADIOLOGY | Facility: HOSPITAL | Age: 72
End: 2017-03-10

## 2017-03-10 LAB
ALBUMIN SERPL-MCNC: 3.3 G/DL (ref 3.4–4.8)
ALBUMIN/GLOB SERPL: 1.3 G/DL (ref 1.5–2.5)
ALP SERPL-CCNC: 45 U/L (ref 35–104)
ALT SERPL W P-5'-P-CCNC: 9 U/L (ref 10–36)
ANION GAP SERPL CALCULATED.3IONS-SCNC: 0.4 MMOL/L (ref 3.6–11.2)
AST SERPL-CCNC: 17 U/L (ref 10–30)
BASOPHILS # BLD AUTO: 0.06 10*3/MM3 (ref 0–0.3)
BASOPHILS NFR BLD AUTO: 0.7 % (ref 0–2)
BILIRUB SERPL-MCNC: 0.2 MG/DL (ref 0.2–1.8)
BNP SERPL-MCNC: 1541 PG/ML (ref 0–100)
BUN BLD-MCNC: 14 MG/DL (ref 7–21)
BUN/CREAT SERPL: 42.4 (ref 7–25)
CALCIUM SPEC-SCNC: 9.3 MG/DL (ref 7.7–10)
CHLORIDE SERPL-SCNC: 110 MMOL/L (ref 99–112)
CO2 SERPL-SCNC: 36.6 MMOL/L (ref 24.3–31.9)
CREAT BLD-MCNC: 0.33 MG/DL (ref 0.43–1.29)
CRP SERPL-MCNC: 1.66 MG/DL (ref 0–0.99)
DEPRECATED RDW RBC AUTO: 51.6 FL (ref 37–54)
EOSINOPHIL # BLD AUTO: 0.44 10*3/MM3 (ref 0–0.7)
EOSINOPHIL NFR BLD AUTO: 5 % (ref 0–7)
ERYTHROCYTE [DISTWIDTH] IN BLOOD BY AUTOMATED COUNT: 14 % (ref 11.5–14.5)
GFR SERPL CREATININE-BSD FRML MDRD: >150 ML/MIN/1.73
GLOBULIN UR ELPH-MCNC: 2.6 GM/DL
GLUCOSE BLD-MCNC: 74 MG/DL (ref 70–110)
HCT VFR BLD AUTO: 36.3 % (ref 37–47)
HGB BLD-MCNC: 10.8 G/DL (ref 12–16)
IMM GRANULOCYTES # BLD: 0.03 10*3/MM3 (ref 0–0.03)
IMM GRANULOCYTES NFR BLD: 0.3 % (ref 0–0.5)
LYMPHOCYTES # BLD AUTO: 2.39 10*3/MM3 (ref 1–3)
LYMPHOCYTES NFR BLD AUTO: 27 % (ref 16–46)
MCH RBC QN AUTO: 29.8 PG (ref 27–33)
MCHC RBC AUTO-ENTMCNC: 29.8 G/DL (ref 33–37)
MCV RBC AUTO: 100 FL (ref 80–94)
MONOCYTES # BLD AUTO: 0.86 10*3/MM3 (ref 0.1–0.9)
MONOCYTES NFR BLD AUTO: 9.7 % (ref 0–12)
NEUTROPHILS # BLD AUTO: 5.08 10*3/MM3 (ref 1.4–6.5)
NEUTROPHILS NFR BLD AUTO: 57.3 % (ref 40–75)
NRBC BLD MANUAL-RTO: 0 /100 WBC (ref 0–0)
OSMOLALITY SERPL CALC.SUM OF ELEC: 291.5 MOSM/KG (ref 273–305)
PLATELET # BLD AUTO: 295 10*3/MM3 (ref 130–400)
PMV BLD AUTO: 11.2 FL (ref 6–10)
POTASSIUM BLD-SCNC: 4.2 MMOL/L (ref 3.5–5.3)
PROT SERPL-MCNC: 5.9 G/DL (ref 6–8)
RBC # BLD AUTO: 3.63 10*6/MM3 (ref 4.2–5.4)
SODIUM BLD-SCNC: 147 MMOL/L (ref 135–153)
WBC NRBC COR # BLD: 8.86 10*3/MM3 (ref 4.5–12.5)

## 2017-03-10 PROCEDURE — 71010 XR CHEST 1 VW: CPT | Performed by: RADIOLOGY

## 2017-03-10 PROCEDURE — 86140 C-REACTIVE PROTEIN: CPT | Performed by: INTERNAL MEDICINE

## 2017-03-10 PROCEDURE — 85025 COMPLETE CBC W/AUTO DIFF WBC: CPT | Performed by: INTERNAL MEDICINE

## 2017-03-10 PROCEDURE — 93005 ELECTROCARDIOGRAM TRACING: CPT | Performed by: INTERNAL MEDICINE

## 2017-03-10 PROCEDURE — 93010 ELECTROCARDIOGRAM REPORT: CPT | Performed by: INTERNAL MEDICINE

## 2017-03-10 PROCEDURE — 71010 HC CHEST PA OR AP: CPT

## 2017-03-10 PROCEDURE — 83880 ASSAY OF NATRIURETIC PEPTIDE: CPT | Performed by: INTERNAL MEDICINE

## 2017-03-10 PROCEDURE — 80053 COMPREHEN METABOLIC PANEL: CPT | Performed by: INTERNAL MEDICINE

## 2017-03-11 LAB
ALBUMIN SERPL-MCNC: 3.2 G/DL (ref 3.4–4.8)
ALBUMIN/GLOB SERPL: 1.4 G/DL (ref 1.5–2.5)
ALP SERPL-CCNC: 42 U/L (ref 35–104)
ALT SERPL W P-5'-P-CCNC: 10 U/L (ref 10–36)
ANION GAP SERPL CALCULATED.3IONS-SCNC: 1.1 MMOL/L (ref 3.6–11.2)
AST SERPL-CCNC: 16 U/L (ref 10–30)
BILIRUB SERPL-MCNC: 0.2 MG/DL (ref 0.2–1.8)
BNP SERPL-MCNC: 1128 PG/ML (ref 0–100)
BUN BLD-MCNC: 15 MG/DL (ref 7–21)
BUN/CREAT SERPL: 32.6 (ref 7–25)
CALCIUM SPEC-SCNC: 8.9 MG/DL (ref 7.7–10)
CHLORIDE SERPL-SCNC: 109 MMOL/L (ref 99–112)
CO2 SERPL-SCNC: 35.9 MMOL/L (ref 24.3–31.9)
CREAT BLD-MCNC: 0.46 MG/DL (ref 0.43–1.29)
DEPRECATED RDW RBC AUTO: 52.6 FL (ref 37–54)
ERYTHROCYTE [DISTWIDTH] IN BLOOD BY AUTOMATED COUNT: 14 % (ref 11.5–14.5)
GFR SERPL CREATININE-BSD FRML MDRD: 134 ML/MIN/1.73
GLOBULIN UR ELPH-MCNC: 2.3 GM/DL
GLUCOSE BLD-MCNC: 84 MG/DL (ref 70–110)
HCT VFR BLD AUTO: 37.6 % (ref 37–47)
HGB BLD-MCNC: 10.4 G/DL (ref 12–16)
MAGNESIUM SERPL-MCNC: 2.4 MG/DL (ref 1.7–2.6)
MCH RBC QN AUTO: 28.8 PG (ref 27–33)
MCHC RBC AUTO-ENTMCNC: 27.7 G/DL (ref 33–37)
MCV RBC AUTO: 104.2 FL (ref 80–94)
OSMOLALITY SERPL CALC.SUM OF ELEC: 290.6 MOSM/KG (ref 273–305)
PHOSPHATE SERPL-MCNC: 3.6 MG/DL (ref 2.7–4.5)
PLATELET # BLD AUTO: 268 10*3/MM3 (ref 130–400)
PMV BLD AUTO: 10.8 FL (ref 6–10)
POTASSIUM BLD-SCNC: 4.1 MMOL/L (ref 3.5–5.3)
PROT SERPL-MCNC: 5.5 G/DL (ref 6–8)
RBC # BLD AUTO: 3.61 10*6/MM3 (ref 4.2–5.4)
SODIUM BLD-SCNC: 146 MMOL/L (ref 135–153)
WBC NRBC COR # BLD: 7.25 10*3/MM3 (ref 4.5–12.5)

## 2017-03-11 PROCEDURE — 93005 ELECTROCARDIOGRAM TRACING: CPT | Performed by: INTERNAL MEDICINE

## 2017-03-11 PROCEDURE — 85027 COMPLETE CBC AUTOMATED: CPT | Performed by: INTERNAL MEDICINE

## 2017-03-11 PROCEDURE — 83880 ASSAY OF NATRIURETIC PEPTIDE: CPT | Performed by: INTERNAL MEDICINE

## 2017-03-11 PROCEDURE — 83735 ASSAY OF MAGNESIUM: CPT | Performed by: INTERNAL MEDICINE

## 2017-03-11 PROCEDURE — 80053 COMPREHEN METABOLIC PANEL: CPT | Performed by: INTERNAL MEDICINE

## 2017-03-11 PROCEDURE — 84100 ASSAY OF PHOSPHORUS: CPT | Performed by: INTERNAL MEDICINE

## 2017-03-12 LAB — VANCOMYCIN TROUGH SERPL-MCNC: 16.5 MCG/ML (ref 5–15)

## 2017-03-12 PROCEDURE — 80202 ASSAY OF VANCOMYCIN: CPT | Performed by: INTERNAL MEDICINE

## 2017-03-13 LAB
A-A DO2: 205.8 MMHG (ref 0–300)
A-A DO2: 239.4 MMHG (ref 0–300)
ANION GAP SERPL CALCULATED.3IONS-SCNC: 7.8 MMOL/L (ref 3.6–11.2)
ARTERIAL PATENCY WRIST A: ABNORMAL
ARTERIAL PATENCY WRIST A: ABNORMAL
ATMOSPHERIC PRESS: 731 MMHG
ATMOSPHERIC PRESS: 731 MMHG
BASE EXCESS BLDA CALC-SCNC: 3.8 MMOL/L
BASE EXCESS BLDA CALC-SCNC: 5.2 MMOL/L
BASOPHILS # BLD AUTO: 0.07 10*3/MM3 (ref 0–0.3)
BASOPHILS NFR BLD AUTO: 0.5 % (ref 0–2)
BDY SITE: ABNORMAL
BDY SITE: ABNORMAL
BNP SERPL-MCNC: 513 PG/ML (ref 0–100)
BODY TEMPERATURE: 98.6 C
BODY TEMPERATURE: 98.6 C
BUN BLD-MCNC: 18 MG/DL (ref 7–21)
BUN/CREAT SERPL: 33.3 (ref 7–25)
CALCIUM SPEC-SCNC: 8.9 MG/DL (ref 7.7–10)
CHLORIDE SERPL-SCNC: 107 MMOL/L (ref 99–112)
CO2 SERPL-SCNC: 27.2 MMOL/L (ref 24.3–31.9)
COHGB MFR BLD: 1.7 % (ref 0–5)
COHGB MFR BLD: 1.8 % (ref 0–5)
CREAT BLD-MCNC: 0.54 MG/DL (ref 0.43–1.29)
DEPRECATED RDW RBC AUTO: 51.4 FL (ref 37–54)
EOSINOPHIL # BLD AUTO: 0.17 10*3/MM3 (ref 0–0.7)
EOSINOPHIL NFR BLD AUTO: 1.1 % (ref 0–7)
EPAP: 10
ERYTHROCYTE [DISTWIDTH] IN BLOOD BY AUTOMATED COUNT: 13.9 % (ref 11.5–14.5)
GFR SERPL CREATININE-BSD FRML MDRD: 111 ML/MIN/1.73
GLUCOSE BLD-MCNC: 146 MG/DL (ref 70–110)
HCO3 BLDA-SCNC: 32.1 MMOL/L (ref 22–26)
HCO3 BLDA-SCNC: 33.5 MMOL/L (ref 22–26)
HCT VFR BLD AUTO: 46.6 % (ref 37–47)
HCT VFR BLD CALC: 35 % (ref 37–47)
HCT VFR BLD CALC: 38 % (ref 37–47)
HGB BLD-MCNC: 13.8 G/DL (ref 12–16)
HGB BLDA-MCNC: 12 G/DL (ref 12–16)
HGB BLDA-MCNC: 13 G/DL (ref 12–16)
HOROWITZ INDEX BLD+IHG-RTO: 55 %
HOROWITZ INDEX BLD+IHG-RTO: 55 %
HYPOCHROMIA BLD QL: NORMAL
IMM GRANULOCYTES # BLD: 0.07 10*3/MM3 (ref 0–0.03)
IMM GRANULOCYTES NFR BLD: 0.5 % (ref 0–0.5)
IPAP: 20
LYMPHOCYTES # BLD AUTO: 3.09 10*3/MM3 (ref 1–3)
LYMPHOCYTES NFR BLD AUTO: 20.8 % (ref 16–46)
MACROCYTES BLD QL SMEAR: NORMAL
MAGNESIUM SERPL-MCNC: 2.3 MG/DL (ref 1.7–2.6)
MCH RBC QN AUTO: 29.9 PG (ref 27–33)
MCHC RBC AUTO-ENTMCNC: 29.6 G/DL (ref 33–37)
MCV RBC AUTO: 101.1 FL (ref 80–94)
METHGB BLD QL: 0.2 % (ref 0–3)
METHGB BLD QL: 0.3 % (ref 0–3)
MODALITY: ABNORMAL
MODALITY: ABNORMAL
MONOCYTES # BLD AUTO: 1.87 10*3/MM3 (ref 0.1–0.9)
MONOCYTES NFR BLD AUTO: 12.6 % (ref 0–12)
NEUTROPHILS # BLD AUTO: 9.61 10*3/MM3 (ref 1.4–6.5)
NEUTROPHILS NFR BLD AUTO: 64.5 % (ref 40–75)
OSMOLALITY SERPL CALC.SUM OF ELEC: 287.7 MOSM/KG (ref 273–305)
OXYHGB MFR BLDV: 88.3 % (ref 85–100)
OXYHGB MFR BLDV: 95.4 % (ref 85–100)
PCO2 BLDA: 68.3 MM HG (ref 35–45)
PCO2 BLDA: 68.8 MM HG (ref 35–45)
PH BLDA: 7.29 PH UNITS (ref 7.35–7.45)
PH BLDA: 7.31 PH UNITS (ref 7.35–7.45)
PLAT MORPH BLD: NORMAL
PLATELET # BLD AUTO: 307 10*3/MM3 (ref 130–400)
PMV BLD AUTO: 11.5 FL (ref 6–10)
PO2 BLDA: 60.3 MM HG (ref 80–100)
PO2 BLDA: 94.4 MM HG (ref 80–100)
POTASSIUM BLD-SCNC: 4.7 MMOL/L (ref 3.5–5.3)
RBC # BLD AUTO: 4.61 10*6/MM3 (ref 4.2–5.4)
SAO2 % BLDCOA: 90.1 % (ref 90–100)
SAO2 % BLDCOA: 97.3 % (ref 90–100)
SET MECH RESP RATE: 20
SODIUM BLD-SCNC: 142 MMOL/L (ref 135–153)
WBC NRBC COR # BLD: 14.88 10*3/MM3 (ref 4.5–12.5)

## 2017-03-13 PROCEDURE — 85025 COMPLETE CBC W/AUTO DIFF WBC: CPT | Performed by: INTERNAL MEDICINE

## 2017-03-13 PROCEDURE — 36600 WITHDRAWAL OF ARTERIAL BLOOD: CPT | Performed by: INTERNAL MEDICINE

## 2017-03-13 PROCEDURE — 82375 ASSAY CARBOXYHB QUANT: CPT | Performed by: INTERNAL MEDICINE

## 2017-03-13 PROCEDURE — 80048 BASIC METABOLIC PNL TOTAL CA: CPT | Performed by: INTERNAL MEDICINE

## 2017-03-13 PROCEDURE — 83880 ASSAY OF NATRIURETIC PEPTIDE: CPT | Performed by: INTERNAL MEDICINE

## 2017-03-13 PROCEDURE — 83050 HGB METHEMOGLOBIN QUAN: CPT | Performed by: INTERNAL MEDICINE

## 2017-03-13 PROCEDURE — 82805 BLOOD GASES W/O2 SATURATION: CPT | Performed by: INTERNAL MEDICINE

## 2017-03-13 PROCEDURE — 85007 BL SMEAR W/DIFF WBC COUNT: CPT | Performed by: INTERNAL MEDICINE

## 2017-03-13 PROCEDURE — 83735 ASSAY OF MAGNESIUM: CPT | Performed by: INTERNAL MEDICINE
